# Patient Record
Sex: FEMALE | Race: BLACK OR AFRICAN AMERICAN | NOT HISPANIC OR LATINO | Employment: UNEMPLOYED | URBAN - METROPOLITAN AREA
[De-identification: names, ages, dates, MRNs, and addresses within clinical notes are randomized per-mention and may not be internally consistent; named-entity substitution may affect disease eponyms.]

---

## 2017-01-30 ENCOUNTER — OFFICE VISIT (OUTPATIENT)
Dept: MATERNAL FETAL MEDICINE | Facility: CLINIC | Age: 32
End: 2017-01-30
Payer: OTHER GOVERNMENT

## 2017-01-30 VITALS
WEIGHT: 157 LBS | SYSTOLIC BLOOD PRESSURE: 99 MMHG | DIASTOLIC BLOOD PRESSURE: 60 MMHG | HEIGHT: 67 IN | BODY MASS INDEX: 24.64 KG/M2

## 2017-01-30 DIAGNOSIS — Z36.82 ENCOUNTER FOR (NT) NUCHAL TRANSLUCENCY SCAN: ICD-10-CM

## 2017-01-30 DIAGNOSIS — Z87.828 HISTORY OF MOTOR VEHICLE ACCIDENT: ICD-10-CM

## 2017-01-30 DIAGNOSIS — Z36.89 ENCOUNTER FOR FETAL ANATOMIC SURVEY: Primary | ICD-10-CM

## 2017-01-30 DIAGNOSIS — O30.041 DICHORIONIC DIAMNIOTIC TWIN PREGNANCY IN FIRST TRIMESTER: ICD-10-CM

## 2017-01-30 PROBLEM — Z86.69 HX OF MIGRAINE HEADACHES: Status: ACTIVE | Noted: 2017-01-30

## 2017-01-30 PROCEDURE — 76813 OB US NUCHAL MEAS 1 GEST: CPT | Mod: S$GLB,,, | Performed by: OBSTETRICS & GYNECOLOGY

## 2017-01-30 PROCEDURE — 76814 OB US NUCHAL MEAS ADD-ON: CPT | Mod: S$GLB,,, | Performed by: OBSTETRICS & GYNECOLOGY

## 2017-01-30 PROCEDURE — 76801 OB US < 14 WKS SINGLE FETUS: CPT | Mod: S$GLB,,, | Performed by: OBSTETRICS & GYNECOLOGY

## 2017-01-30 PROCEDURE — 99205 OFFICE O/P NEW HI 60 MIN: CPT | Mod: 25,S$GLB,, | Performed by: OBSTETRICS & GYNECOLOGY

## 2017-01-30 PROCEDURE — 76802 OB US < 14 WKS ADDL FETUS: CPT | Mod: S$GLB,,, | Performed by: OBSTETRICS & GYNECOLOGY
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
## 2017-02-06 ENCOUNTER — TELEPHONE (OUTPATIENT)
Dept: MATERNAL FETAL MEDICINE | Facility: CLINIC | Age: 32
End: 2017-02-06

## 2017-02-06 NOTE — TELEPHONE ENCOUNTER
Pt has been notified of Negative 1st trimester sequential screen result and that final result pending 2nd trimeter blood draw on 3/13/17.    Pt verbalized understanding of information.

## 2017-03-13 ENCOUNTER — OFFICE VISIT (OUTPATIENT)
Dept: MATERNAL FETAL MEDICINE | Facility: CLINIC | Age: 32
End: 2017-03-13
Payer: OTHER GOVERNMENT

## 2017-03-13 VITALS — DIASTOLIC BLOOD PRESSURE: 68 MMHG | SYSTOLIC BLOOD PRESSURE: 108 MMHG | WEIGHT: 167.88 LBS | BODY MASS INDEX: 26.3 KG/M2

## 2017-03-13 DIAGNOSIS — Z36.89 ENCOUNTER FOR FETAL ANATOMIC SURVEY: ICD-10-CM

## 2017-03-13 DIAGNOSIS — O28.3 ECHOGENIC INTRACARDIAC FOCUS OF FETUS ON PRENATAL ULTRASOUND: ICD-10-CM

## 2017-03-13 DIAGNOSIS — Z36.89 ENCOUNTER FOR ULTRASOUND TO CHECK FETAL GROWTH: Primary | ICD-10-CM

## 2017-03-13 PROCEDURE — 76811 OB US DETAILED SNGL FETUS: CPT | Mod: S$GLB,,, | Performed by: OBSTETRICS & GYNECOLOGY

## 2017-03-13 PROCEDURE — 99214 OFFICE O/P EST MOD 30 MIN: CPT | Mod: 25,S$GLB,, | Performed by: OBSTETRICS & GYNECOLOGY

## 2017-03-13 PROCEDURE — 76812 OB US DETAILED ADDL FETUS: CPT | Mod: S$GLB,,, | Performed by: OBSTETRICS & GYNECOLOGY

## 2017-03-13 RX ORDER — OXYCODONE AND ACETAMINOPHEN 10; 325 MG/1; MG/1
1 TABLET ORAL EVERY 4 HOURS PRN
Status: ON HOLD | COMMUNITY
End: 2017-07-03 | Stop reason: HOSPADM

## 2017-03-13 RX ORDER — NAPROXEN SODIUM 220 MG/1
81 TABLET, FILM COATED ORAL DAILY
COMMUNITY

## 2017-03-13 NOTE — PROGRESS NOTES
A detailed fetal anatomical ultrasound was completed today.  See official report in the imaging section of [facility].  Ultrasound noted no obvious fetal abnormalities.  Ultrasound findings consistent with established dating.    Echogenic intracardiac focus counseling and recommendations:    An echogenic intracardiac focus (EIF) was identified on ultrasound today. A detailed fetal anatomic ultrasound exam was performed, and no fetal structural malformations or other sonographic soft markers associated with Down Syndrome were seen. The patient is at low risk for Down Syndrome based on age, family hx, and/or prior screening. Given these factors, the risk for Down Syndrome is low and is not appreciably altered from the patients age-associated or screening-adjusted risk. Therefore, further screening or testing is not recommended nor was counseling for this finding performed.  EIFs occur in 5 - 15% of normal fetuses and are not associated with structural or functional cardiac abnormalities.  Thus, no further  sonographic evaluation nor  cardiac evaluation is recommended.  If either the patient or referring MD desires more information regarding this finding, a consultation with M can be scheduled. Please contact our office if you wish to schedule a consultation.    We also discussed contractility in twin gestation as she feels some contractions after exercise.  We discussed  Exercise regimens in pregnancy and types of exercise in pregnancy were recommended.

## 2017-03-20 ENCOUNTER — TELEPHONE (OUTPATIENT)
Dept: MATERNAL FETAL MEDICINE | Facility: CLINIC | Age: 32
End: 2017-03-20

## 2017-03-20 NOTE — TELEPHONE ENCOUNTER
Patient called c/o mild cramping every 15-20 minutes. Denies LOF and bleeding. Reports +FM. States she has only had 2 glasses of water today. Dr Sheridan notified and advised to hydrate. RN instructed patient to drink at least 8 glasses of water each day and to call her primary OB at [facility] if cramping did not resolve or became more frequent/painful. Patient verbalized understanding.

## 2017-04-04 ENCOUNTER — OFFICE VISIT (OUTPATIENT)
Dept: MATERNAL FETAL MEDICINE | Facility: CLINIC | Age: 32
End: 2017-04-04
Payer: OTHER GOVERNMENT

## 2017-04-04 VITALS — SYSTOLIC BLOOD PRESSURE: 106 MMHG | DIASTOLIC BLOOD PRESSURE: 55 MMHG

## 2017-04-04 DIAGNOSIS — Z36.89 ENCOUNTER FOR ULTRASOUND TO CHECK FETAL GROWTH: ICD-10-CM

## 2017-04-04 DIAGNOSIS — O28.0 ABNORMAL QUAD SCREEN: ICD-10-CM

## 2017-04-04 PROCEDURE — 99499 UNLISTED E&M SERVICE: CPT | Mod: S$GLB,,, | Performed by: OBSTETRICS & GYNECOLOGY

## 2017-04-04 PROCEDURE — 76812 OB US DETAILED ADDL FETUS: CPT | Mod: S$GLB,,, | Performed by: OBSTETRICS & GYNECOLOGY

## 2017-04-04 PROCEDURE — 76811 OB US DETAILED SNGL FETUS: CPT | Mod: S$GLB,,, | Performed by: OBSTETRICS & GYNECOLOGY

## 2017-05-02 ENCOUNTER — OFFICE VISIT (OUTPATIENT)
Dept: MATERNAL FETAL MEDICINE | Facility: CLINIC | Age: 32
End: 2017-05-02
Payer: OTHER GOVERNMENT

## 2017-05-02 VITALS — DIASTOLIC BLOOD PRESSURE: 60 MMHG | SYSTOLIC BLOOD PRESSURE: 104 MMHG

## 2017-05-02 DIAGNOSIS — Z36.89 ENCOUNTER FOR ULTRASOUND TO CHECK FETAL GROWTH: ICD-10-CM

## 2017-05-02 PROCEDURE — 76817 TRANSVAGINAL US OBSTETRIC: CPT | Mod: S$GLB,,, | Performed by: OBSTETRICS & GYNECOLOGY

## 2017-05-02 PROCEDURE — 99214 OFFICE O/P EST MOD 30 MIN: CPT | Mod: GT,25,S$GLB, | Performed by: OBSTETRICS & GYNECOLOGY

## 2017-05-02 PROCEDURE — 76816 OB US FOLLOW-UP PER FETUS: CPT | Mod: 59,S$GLB,, | Performed by: OBSTETRICS & GYNECOLOGY

## 2017-05-02 NOTE — PROGRESS NOTES
See viewpoint US report    The pt. C/o increasing contractility which has brought her to the hospital several times.  The cervix is noted to be short and funneled but with what appears to be good cervical body at least 2.5 cm in length.  I reiterated the lack of intervention that is effective in twin PTL.  I discussed the role of steroids and Mg++ in the process.

## 2017-05-09 ENCOUNTER — TELEPHONE (OUTPATIENT)
Dept: MATERNAL FETAL MEDICINE | Facility: CLINIC | Age: 32
End: 2017-05-09

## 2017-05-09 NOTE — TELEPHONE ENCOUNTER
"----- Message from Yoselin Teddy sent at 5/9/2017  4:33 PM CDT -----  Contact: self  Pt calling to speak with Dr Burr's nurse regarding light headed,contractions at night, shortness of breath all symptoms patient has been experiencing for last two days. Pt has been cramping for last month due to short cervix per patient. Pt can be reached at [phone].      Pt states that for the past 2 days she has been feeling "lightheaded, overheated and dizzy." Pt also reports "pains in my chest." Pt reports that she "just does not feel good." Pt states that she is drinking water "all day long," but still wakes up during the night with contractions, "around 7-8." Recommended that pt go to nearest hospital ([facility]) for evaluation. Pt currently 27w2d with Di-Di twins and short cervix per M ultrasound. Pt was waiting for a "call back" from the nurse at [facility], but I informed pt that she can go straight to the hospital for evaluation. Also recommended that pt have someone drive her given the symptoms of feeling "dizzy."    Pt verbalized understanding of information and pt also given the contact information for [facility] L&D.      "

## 2017-05-23 ENCOUNTER — OFFICE VISIT (OUTPATIENT)
Dept: MATERNAL FETAL MEDICINE | Facility: CLINIC | Age: 32
End: 2017-05-23
Payer: OTHER GOVERNMENT

## 2017-05-23 DIAGNOSIS — O30.043 DICHORIONIC DIAMNIOTIC TWIN PREGNANCY IN THIRD TRIMESTER: ICD-10-CM

## 2017-05-23 DIAGNOSIS — O40.3XX2: ICD-10-CM

## 2017-05-23 DIAGNOSIS — Z36.89 ENCOUNTER FOR ULTRASOUND TO CHECK FETAL GROWTH: ICD-10-CM

## 2017-05-23 PROCEDURE — 99499 UNLISTED E&M SERVICE: CPT | Mod: S$GLB,,, | Performed by: OBSTETRICS & GYNECOLOGY

## 2017-05-23 PROCEDURE — 76816 OB US FOLLOW-UP PER FETUS: CPT | Mod: S$GLB,,, | Performed by: OBSTETRICS & GYNECOLOGY

## 2017-05-23 NOTE — PROGRESS NOTES
"Indication  ========    Twin, Evaluation of fetal growth.    History  ======    General History  Height 170 cm  Height (ft) 5 ft  Height (in) 7 in  Other: Dr. Niki Kitchen  Medical History  Past surgical history: Previous surgeries performed  Surgery:  section  Year   Previous Outcomes   2  Para 1  [place] children born living (T) 1  [place] children born (T) 1  [place] living children (L) 1  Risk Factors  History risk factors: Multiple gestation  Details: Di-Di twins  History risk factors: Hx Preeclampsia  Details: Per patient  Medication  Medication: PNV  Medication: Iron  Medication: magox  Medication: baby asa  Medication: percocet    Pregnancy History  ==============    Maternal Lab Tests  Test: Sequential Screen Part 2  Result: Positive DSR 1:140. Age DSR 1:711  Wants to know gender: yes    Method  ======    Transabdominal ultrasound examination. View: Sufficient.    Pregnancy  =========    Twin pregnancy. Dichorionic-diamniotic. Number of fetuses: 2.    Dating  ======    Cycle: regular cycle  GA by "stated dating" 29 w + 2 d  DARIN by "stated dating": 2017  Ultrasound examination on: 2017  GA by U/S based upon: AC, BPD, Femur, HC  GA by U/S 29 w + 2 d  DARIN by U/S: 2017  GA by U/S based upon (Fetus 2): AC, BPD, Femur, HC  GA by U/S (Fetus 2) 29 w + 2 d  DARIN by U/S (Fetus 2): 2017  Assigned: The Best Overall Assessment is based on the stated EDC.  Assigned GA 29 w + 2 d  Assigned DARIN: 2017    General Evaluation (1)  =================    Cardiac activity: present.  bpm.  Fetal movements: visualized.  Presentation: cephalic right.  Placenta:  Placental site: anterior.  Umbilical cord: Cord vessels: 3 vessel cord.  Amniotic fluid: Amount of AF: normal amount. MVP 3.8 cm.    Biophysical Profile (1)  =================    2: Fetal breathing movements  2: Gross body movements  2: Fetal tone  2: Amniotic fluid volume  : Biophysical profile score    Fetal Biometry " (1)  ==============    Fetal Biometry  BPD 75.3 mm 30w 1d Hadlock  OFD 96.1 mm 31w 0d Kristal  .0 mm 29w 6d Hadlock  .4 mm 28w 4d Hadlock  Femur 53.1 mm 28w 1d Hadlock  Humerus 49.2 mm 29w 0d Kristal  EFW 1,270 g 20% 28w 2d Hadlock  Calculated by: Hadlock (BPD-HC-AC-FL)  EFW (lb) 2 lb  EFW (oz) 13 oz  EFW discordance 9.4 %  Cephalic index 0.78  HC / AC 1.13  FL / BPD 0.71  FL / AC 0.22  MVP 3.8 cm   bpm    Fetal Anatomy (1)  ==============    Cranium: normal  Lateral ventricles: normal  Choroid plexus: documented previously  Midline falx: documented previously  Cavum septi pellucidi: normal  Cerebellum: documented previously  Cisterna magna: documented previously  Lips: documented previously  Profile: documented previously  Nose: documented previously  RVOT: documented previously  LVOT: documented previously  4-chamber view: 4-chamber normal, septum previously documented  Situs: documented previously  Diaphragm: normal  Cord insertion: documented previously  Stomach: normal  Kidneys: normal  Bladder: normal  Genitals: normal  Cervical spine: documented previously  Thoracic spine: documented previously  Lumbar spine: documented previously  Sacral spine: documented previously  Arms: both visualized  Legs: both visualized  Gender: male  Wants to know gender: yes  Other: Right hand previously open. Left hand previously seen open    General Evaluation (2)  =================    Cardiac activity: present.  bpm.  Fetal movements: visualized.  Presentation: breech left.  Placenta:  Placental site: left lateral, fundal.  Umbilical cord: Cord vessels: 3 vessel cord.  Amniotic fluid: Amount of AF: polyhydramnios. MVP 8.7 cm.    Biophysical Profile (2)  =================    2: Fetal breathing movements  2: Gross body movements  2: Fetal tone  2: Amniotic fluid volume  8/8: Biophysical profile score    Fetal Biometry (2)  ==============    Fetal Biometry  BPD 72.6 mm 29w 1d Hadlock  OFD 95.6 mm 30w 6d  Kristal  .0 mm 29w 4d Hadlock  .8 mm 30w 0d Hadlock  Femur 54.2 mm 28w 5d Hadlock  Humerus 49.9 mm 29w 2d Kristal  EFW 1,402 g 44% 29w 1d Hadlock  Calculated by: Hadlock (BPD-HC-AC-FL)  EFW (lb) 3 lb  EFW (oz) 1 oz  EFW discordance 9.4 %  Cephalic index 0.76  HC / AC 1.05  FL / BPD 0.75  FL / AC 0.21  MVP 8.7 cm   bpm    Fetal Anatomy (2)  ==============    Cranium: normal  Lateral ventricles: normal  Choroid plexus: documented previously  Midline falx: documented previously  Cavum septi pellucidi: normal  Cerebellum: documented previously  Cisterna magna: documented previously  Lips: documented previously  Profile: normal  Nose: documented previously  RVOT: documented previously  LVOT: documented previously  4-chamber view: 4-chamber normal, septum previously documented  Situs: documented previously  Diaphragm: normal  Cord insertion: documented previously  Stomach: normal  Kidneys: normal  Bladder: normal  Genitals: normal  Cervical spine: documented previously  Thoracic spine: documented previously  Lumbar spine: documented previously  Sacral spine: documented previously  Arms: both visualized  Legs: both visualized  Gender: male  Wants to know gender: yes  Other: Right hand previously open. Left hand previously seen open    Impression  =========    Live dichorionic/diamniotic twin intrauterine gestation.  Both fetuses with overall normal fetal growth. Fetus A at the 20th percentile. Fetus B at the 44th percentile.  There is 9.4 percent discordance.  Fetus A has a normal amniotic fluid volume.  Fetus B has polyhydramnios. The differential diagnosis of polyhydramnios was discussed with the patient including but not limited to idiopathic  causes, gestational diabetes, chromosomal abnormalities, genetic disorders, structural malformations, and neuromuscular conditions. The  fetal stomach appeared normal today. The patient's genetic screening was positive for an increased risk for Trisomy 21 and she  declined  amniocentesis. Polyhydramnios increases the risk for  labor, PPROM, and abruption. The patient is already at higher risk for  complications due to the twin gestation. The patient had her GDM screening yesterday and is awaiting results.  Limited anatomy of both fetuses was normal.  BPP 8/8 on both fetuses.      Recommendation  ==============    Weekly BPP with MFM  Follow up ultrasound for fetal growth in 3 weeks.

## 2017-05-30 ENCOUNTER — OFFICE VISIT (OUTPATIENT)
Dept: MATERNAL FETAL MEDICINE | Facility: CLINIC | Age: 32
End: 2017-05-30
Payer: OTHER GOVERNMENT

## 2017-05-30 DIAGNOSIS — Z36.89 ENCOUNTER FOR ULTRASOUND TO CHECK FETAL GROWTH: ICD-10-CM

## 2017-05-30 PROBLEM — O30.043 DICHORIONIC DIAMNIOTIC TWIN PREGNANCY IN THIRD TRIMESTER: Status: ACTIVE | Noted: 2017-01-30

## 2017-05-30 PROCEDURE — 99499 UNLISTED E&M SERVICE: CPT | Mod: S$GLB,,, | Performed by: OBSTETRICS & GYNECOLOGY

## 2017-05-30 PROCEDURE — 76815 OB US LIMITED FETUS(S): CPT | Mod: S$GLB,,, | Performed by: OBSTETRICS & GYNECOLOGY

## 2017-05-30 PROCEDURE — 76819 FETAL BIOPHYS PROFIL W/O NST: CPT | Mod: 59,S$GLB,, | Performed by: OBSTETRICS & GYNECOLOGY

## 2017-06-06 ENCOUNTER — OFFICE VISIT (OUTPATIENT)
Dept: MATERNAL FETAL MEDICINE | Facility: CLINIC | Age: 32
End: 2017-06-06
Payer: OTHER GOVERNMENT

## 2017-06-06 DIAGNOSIS — Z36.89 ENCOUNTER FOR ULTRASOUND TO CHECK FETAL GROWTH: ICD-10-CM

## 2017-06-06 PROCEDURE — 76819 FETAL BIOPHYS PROFIL W/O NST: CPT | Mod: S$GLB,,, | Performed by: OBSTETRICS & GYNECOLOGY

## 2017-06-06 PROCEDURE — 99499 UNLISTED E&M SERVICE: CPT | Mod: S$GLB,,, | Performed by: OBSTETRICS & GYNECOLOGY

## 2017-06-07 ENCOUNTER — TELEPHONE (OUTPATIENT)
Dept: MATERNAL FETAL MEDICINE | Facility: CLINIC | Age: 32
End: 2017-06-07

## 2017-06-07 NOTE — TELEPHONE ENCOUNTER
"----- Message from Yi Rock sent at 6/7/2017  9:49 AM CDT -----  Contact: Self  Pt Modesta Cerda MRN MRN [id number] calling to see if she should go to triage, only felt baby move 2 times since 5 am. Pt going to call OB as well. Pt can be reached at [phone].       Pt states that she has been up since 5am, drank "cold water, orange juice and ate donuts," and pt has noticed that the babies (twin gestation at 31w3d) "have not been moving as much." Recommended that pt got to the L&D at [facility] for evaluation. Pt verbalized understanding of information.    "

## 2017-06-13 ENCOUNTER — OFFICE VISIT (OUTPATIENT)
Dept: MATERNAL FETAL MEDICINE | Facility: CLINIC | Age: 32
End: 2017-06-13
Payer: OTHER GOVERNMENT

## 2017-06-13 DIAGNOSIS — Z36.89 ENCOUNTER FOR ULTRASOUND TO CHECK FETAL GROWTH: Primary | ICD-10-CM

## 2017-06-13 DIAGNOSIS — Z36.89 ENCOUNTER FOR ULTRASOUND TO CHECK FETAL GROWTH: ICD-10-CM

## 2017-06-13 PROCEDURE — 99499 UNLISTED E&M SERVICE: CPT | Mod: S$GLB,,, | Performed by: OBSTETRICS & GYNECOLOGY

## 2017-06-13 PROCEDURE — 76819 FETAL BIOPHYS PROFIL W/O NST: CPT | Mod: 59,S$GLB,, | Performed by: OBSTETRICS & GYNECOLOGY

## 2017-06-13 PROCEDURE — 76816 OB US FOLLOW-UP PER FETUS: CPT | Mod: 59,S$GLB,, | Performed by: OBSTETRICS & GYNECOLOGY

## 2017-06-20 ENCOUNTER — OFFICE VISIT (OUTPATIENT)
Dept: MATERNAL FETAL MEDICINE | Facility: CLINIC | Age: 32
End: 2017-06-20
Payer: OTHER GOVERNMENT

## 2017-06-20 DIAGNOSIS — Z36.89 ENCOUNTER FOR ULTRASOUND TO CHECK FETAL GROWTH: ICD-10-CM

## 2017-06-20 PROCEDURE — 76819 FETAL BIOPHYS PROFIL W/O NST: CPT | Mod: 59,S$GLB,, | Performed by: OBSTETRICS & GYNECOLOGY

## 2017-06-20 PROCEDURE — 99499 UNLISTED E&M SERVICE: CPT | Mod: S$GLB,,, | Performed by: OBSTETRICS & GYNECOLOGY

## 2017-06-20 PROCEDURE — 76815 OB US LIMITED FETUS(S): CPT | Mod: S$GLB,,, | Performed by: OBSTETRICS & GYNECOLOGY

## 2017-06-27 ENCOUNTER — OFFICE VISIT (OUTPATIENT)
Dept: MATERNAL FETAL MEDICINE | Facility: CLINIC | Age: 32
End: 2017-06-27
Payer: OTHER GOVERNMENT

## 2017-06-27 DIAGNOSIS — Z36.89 ENCOUNTER FOR ULTRASOUND TO CHECK FETAL GROWTH: ICD-10-CM

## 2017-06-27 PROCEDURE — 76819 FETAL BIOPHYS PROFIL W/O NST: CPT | Mod: S$GLB,,, | Performed by: OBSTETRICS & GYNECOLOGY

## 2017-06-27 PROCEDURE — 99499 UNLISTED E&M SERVICE: CPT | Mod: S$GLB,,, | Performed by: OBSTETRICS & GYNECOLOGY

## 2017-06-30 ENCOUNTER — HOSPITAL ENCOUNTER (INPATIENT)
Facility: OTHER | Age: 32
LOS: 3 days | Discharge: HOME | End: 2017-07-04
Attending: OBSTETRICS & GYNECOLOGY | Admitting: OBSTETRICS & GYNECOLOGY
Payer: OTHER GOVERNMENT

## 2017-06-30 ENCOUNTER — ANESTHESIA (OUTPATIENT)
Dept: OBSTETRICS AND GYNECOLOGY | Facility: OTHER | Age: 32
End: 2017-06-30
Payer: OTHER GOVERNMENT

## 2017-06-30 DIAGNOSIS — O36.8390 NON-REASSURING ELECTRONIC FETAL MONITORING TRACING: ICD-10-CM

## 2017-06-30 DIAGNOSIS — Z98.891 S/P REPEAT LOW TRANSVERSE C-SECTION: Primary | ICD-10-CM

## 2017-06-30 PROBLEM — Z87.59 HISTORY OF ECLAMPSIA: Status: ACTIVE | Noted: 2017-06-30

## 2017-06-30 LAB
ABO + RH BLD: NORMAL
ALBUMIN SERPL BCP-MCNC: 2.7 G/DL
ALP SERPL-CCNC: 154 U/L
ALT SERPL W/O P-5'-P-CCNC: 12 U/L
ANION GAP SERPL CALC-SCNC: 8 MMOL/L
AST SERPL-CCNC: 17 U/L
BASOPHILS # BLD AUTO: 0.02 K/UL
BASOPHILS NFR BLD: 0.2 %
BILIRUB SERPL-MCNC: 0.5 MG/DL
BILIRUB UR QL STRIP: NEGATIVE
BLD GP AB SCN CELLS X3 SERPL QL: NORMAL
BUN SERPL-MCNC: 4 MG/DL
CALCIUM SERPL-MCNC: 8.8 MG/DL
CHLORIDE SERPL-SCNC: 109 MMOL/L
CLARITY UR: CLEAR
CO2 SERPL-SCNC: 21 MMOL/L
COLOR UR: YELLOW
CREAT SERPL-MCNC: 0.6 MG/DL
CREAT UR-MCNC: 115.1 MG/DL
DIFFERENTIAL METHOD: ABNORMAL
EOSINOPHIL # BLD AUTO: 0 K/UL
EOSINOPHIL NFR BLD: 0.2 %
ERYTHROCYTE [DISTWIDTH] IN BLOOD BY AUTOMATED COUNT: 14.1 %
EST. GFR  (AFRICAN AMERICAN): >60 ML/MIN/1.73 M^2
EST. GFR  (NON AFRICAN AMERICAN): >60 ML/MIN/1.73 M^2
GLUCOSE SERPL-MCNC: 59 MG/DL
GLUCOSE UR QL STRIP: NEGATIVE
HCT VFR BLD AUTO: 29.2 %
HGB BLD-MCNC: 9.7 G/DL
HGB UR QL STRIP: ABNORMAL
KETONES UR QL STRIP: ABNORMAL
LEUKOCYTE ESTERASE UR QL STRIP: NEGATIVE
LYMPHOCYTES # BLD AUTO: 1.5 K/UL
LYMPHOCYTES NFR BLD: 17.4 %
MCH RBC QN AUTO: 30.3 PG
MCHC RBC AUTO-ENTMCNC: 33.2 %
MCV RBC AUTO: 91 FL
MONOCYTES # BLD AUTO: 0.4 K/UL
MONOCYTES NFR BLD: 4.1 %
NEUTROPHILS # BLD AUTO: 6.5 K/UL
NEUTROPHILS NFR BLD: 76.2 %
NITRITE UR QL STRIP: NEGATIVE
PH UR STRIP: 7 [PH] (ref 5–8)
PLATELET # BLD AUTO: 197 K/UL
PMV BLD AUTO: 9.2 FL
POTASSIUM SERPL-SCNC: 3.8 MMOL/L
PROT SERPL-MCNC: 6.2 G/DL
PROT UR QL STRIP: NEGATIVE
PROT UR-MCNC: 17 MG/DL
PROT/CREAT RATIO, UR: 0.15
RBC # BLD AUTO: 3.2 M/UL
SODIUM SERPL-SCNC: 138 MMOL/L
SP GR UR STRIP: 1.01 (ref 1–1.03)
URN SPEC COLLECT METH UR: ABNORMAL
UROBILINOGEN UR STRIP-ACNC: NEGATIVE EU/DL
WBC # BLD AUTO: 8.47 K/UL

## 2017-06-30 PROCEDURE — 81003 URINALYSIS AUTO W/O SCOPE: CPT

## 2017-06-30 PROCEDURE — 59025 FETAL NON-STRESS TEST: CPT | Mod: 26,,, | Performed by: OBSTETRICS & GYNECOLOGY

## 2017-06-30 PROCEDURE — G0378 HOSPITAL OBSERVATION PER HR: HCPCS

## 2017-06-30 PROCEDURE — 80053 COMPREHEN METABOLIC PANEL: CPT

## 2017-06-30 PROCEDURE — 25000003 PHARM REV CODE 250: Performed by: STUDENT IN AN ORGANIZED HEALTH CARE EDUCATION/TRAINING PROGRAM

## 2017-06-30 PROCEDURE — 85025 COMPLETE CBC W/AUTO DIFF WBC: CPT

## 2017-06-30 PROCEDURE — 11000001 HC ACUTE MED/SURG PRIVATE ROOM

## 2017-06-30 PROCEDURE — 25000003 PHARM REV CODE 250: Performed by: OBSTETRICS & GYNECOLOGY

## 2017-06-30 PROCEDURE — 82570 ASSAY OF URINE CREATININE: CPT

## 2017-06-30 PROCEDURE — 99284 EMERGENCY DEPT VISIT MOD MDM: CPT

## 2017-06-30 PROCEDURE — 86901 BLOOD TYPING SEROLOGIC RH(D): CPT

## 2017-06-30 PROCEDURE — 99284 EMERGENCY DEPT VISIT MOD MDM: CPT | Mod: 25,,, | Performed by: OBSTETRICS & GYNECOLOGY

## 2017-06-30 PROCEDURE — 86900 BLOOD TYPING SEROLOGIC ABO: CPT

## 2017-06-30 PROCEDURE — 99219 PR INITIAL OBSERVATION CARE,LEVL II: CPT | Mod: 25,,, | Performed by: OBSTETRICS & GYNECOLOGY

## 2017-06-30 RX ORDER — SIMETHICONE 80 MG
1 TABLET,CHEWABLE ORAL EVERY 6 HOURS PRN
Status: DISCONTINUED | OUTPATIENT
Start: 2017-06-30 | End: 2017-07-01

## 2017-06-30 RX ORDER — DIPHENHYDRAMINE HCL 25 MG
25 CAPSULE ORAL EVERY 4 HOURS PRN
Status: DISCONTINUED | OUTPATIENT
Start: 2017-06-30 | End: 2017-07-01

## 2017-06-30 RX ORDER — DOCUSATE SODIUM 100 MG/1
100 CAPSULE, LIQUID FILLED ORAL DAILY
Status: DISCONTINUED | OUTPATIENT
Start: 2017-06-30 | End: 2017-06-30

## 2017-06-30 RX ORDER — IRON POLYSACCHARIDE COMPLEX 150 MG
150 CAPSULE ORAL DAILY
Status: DISCONTINUED | OUTPATIENT
Start: 2017-06-30 | End: 2017-07-04 | Stop reason: HOSPADM

## 2017-06-30 RX ORDER — DEXTROSE MONOHYDRATE, SODIUM CHLORIDE, AND POTASSIUM CHLORIDE 50; 1.49; 9 G/1000ML; G/1000ML; G/1000ML
INJECTION, SOLUTION INTRAVENOUS CONTINUOUS
Status: DISCONTINUED | OUTPATIENT
Start: 2017-06-30 | End: 2017-07-01

## 2017-06-30 RX ORDER — DOCUSATE SODIUM 100 MG/1
100 CAPSULE, LIQUID FILLED ORAL DAILY
Status: DISCONTINUED | OUTPATIENT
Start: 2017-06-30 | End: 2017-07-01

## 2017-06-30 RX ORDER — OXYCODONE AND ACETAMINOPHEN 5; 325 MG/1; MG/1
1 TABLET ORAL 2 TIMES DAILY
Status: DISCONTINUED | OUTPATIENT
Start: 2017-06-30 | End: 2017-06-30

## 2017-06-30 RX ORDER — BETAMETHASONE SODIUM PHOSPHATE AND BETAMETHASONE ACETATE 3; 3 MG/ML; MG/ML
12 INJECTION, SUSPENSION INTRA-ARTICULAR; INTRALESIONAL; INTRAMUSCULAR; SOFT TISSUE ONCE
Status: COMPLETED | OUTPATIENT
Start: 2017-07-01 | End: 2017-07-01

## 2017-06-30 RX ORDER — ACETAMINOPHEN 325 MG/1
650 TABLET ORAL ONCE
Status: COMPLETED | OUTPATIENT
Start: 2017-06-30 | End: 2017-06-30

## 2017-06-30 RX ORDER — IRON POLYSACCHARIDE COMPLEX 150 MG
150 CAPSULE ORAL DAILY
Status: DISCONTINUED | OUTPATIENT
Start: 2017-06-30 | End: 2017-06-30

## 2017-06-30 RX ORDER — SODIUM CHLORIDE, SODIUM LACTATE, POTASSIUM CHLORIDE, CALCIUM CHLORIDE 600; 310; 30; 20 MG/100ML; MG/100ML; MG/100ML; MG/100ML
INJECTION, SOLUTION INTRAVENOUS CONTINUOUS
Status: DISCONTINUED | OUTPATIENT
Start: 2017-06-30 | End: 2017-06-30

## 2017-06-30 RX ORDER — AMOXICILLIN 250 MG
1 CAPSULE ORAL NIGHTLY PRN
Status: DISCONTINUED | OUTPATIENT
Start: 2017-06-30 | End: 2017-07-01

## 2017-06-30 RX ORDER — DIPHENHYDRAMINE HYDROCHLORIDE 50 MG/ML
25 INJECTION INTRAMUSCULAR; INTRAVENOUS EVERY 4 HOURS PRN
Status: DISCONTINUED | OUTPATIENT
Start: 2017-06-30 | End: 2017-07-01

## 2017-06-30 RX ORDER — ONDANSETRON 8 MG/1
8 TABLET, ORALLY DISINTEGRATING ORAL EVERY 8 HOURS PRN
Status: DISCONTINUED | OUTPATIENT
Start: 2017-06-30 | End: 2017-07-01

## 2017-06-30 RX ORDER — PRENATAL WITH FERROUS FUM AND FOLIC ACID 3080; 920; 120; 400; 22; 1.84; 3; 20; 10; 1; 12; 200; 27; 25; 2 [IU]/1; [IU]/1; MG/1; [IU]/1; MG/1; MG/1; MG/1; MG/1; MG/1; MG/1; UG/1; MG/1; MG/1; MG/1; MG/1
1 TABLET ORAL DAILY
Status: DISCONTINUED | OUTPATIENT
Start: 2017-06-30 | End: 2017-07-01

## 2017-06-30 RX ORDER — NAPROXEN SODIUM 220 MG/1
81 TABLET, FILM COATED ORAL DAILY
Status: DISCONTINUED | OUTPATIENT
Start: 2017-06-30 | End: 2017-07-01

## 2017-06-30 RX ORDER — OXYCODONE AND ACETAMINOPHEN 5; 325 MG/1; MG/1
1 TABLET ORAL 2 TIMES DAILY
Status: DISCONTINUED | OUTPATIENT
Start: 2017-06-30 | End: 2017-07-01

## 2017-06-30 RX ADMIN — DEXTROSE, SODIUM CHLORIDE, AND POTASSIUM CHLORIDE: 5; .9; .15 INJECTION INTRAVENOUS at 07:06

## 2017-06-30 RX ADMIN — OXYCODONE HYDROCHLORIDE AND ACETAMINOPHEN 1 TABLET: 5; 325 TABLET ORAL at 06:06

## 2017-06-30 RX ADMIN — ACETAMINOPHEN 650 MG: 325 TABLET ORAL at 04:06

## 2017-06-30 RX ADMIN — SODIUM CHLORIDE, SODIUM LACTATE, POTASSIUM CHLORIDE, AND CALCIUM CHLORIDE: .6; .31; .03; .02 INJECTION, SOLUTION INTRAVENOUS at 01:06

## 2017-06-30 RX ADMIN — DOCUSATE SODIUM 100 MG: 100 CAPSULE, LIQUID FILLED ORAL at 09:06

## 2017-06-30 RX ADMIN — Medication 150 MG: at 09:06

## 2017-06-30 RX ADMIN — ASPIRIN 81 MG: 81 TABLET, CHEWABLE ORAL at 01:06

## 2017-06-30 NOTE — H&P
[facility]  Obstetrics  History & Physical    Patient Name: Modesta George  MRN: [id number]  Admission Date: 2017  Primary Care Provider: [facility]    Subjective:     Principal Problem:Non-reassuring electronic fetal monitoring tracing    History of Present Illness:  Modesta George is a 31 y.o. [id number] Di-Di twins at 34w5d presents from outside hospital ([facility]) with category 2 strip for both fetal tracings. Patient has history of  section due to non reassuring fetal heart tones and history of post partum Eclampsia. This IUP is complicated by GBS UTI and abnormal quad screen earlier in pregnancy and declined confirmatory testing. Patient reports migraines and backpain that currently treated with Percocet. Baby B previously polyhydramniotic but on newest US MVP 7.8cm.     Obstetric HPI:  Patient denies contractions, active fetal movement, No vaginal bleeding , No loss of fluid       Obstetric History       T1      L1     SAB0   TAB0   Ectopic0   Multiple0   Live Births1       # Outcome Date GA Lbr Elmer/2nd Weight Sex Delivery Anes PTL Lv   2 Current            1 Term 2013 37w0d  3.033 kg (6 lb 11 oz) M CS-LTranv   CAROL      Complications: Fetal Intolerance,Pre-eclampsia        Past Medical History:   Diagnosis Date    Arthritis of back     Bulging lumbar disc     Migraines      Past Surgical History:   Procedure Laterality Date     SECTION         PTA Medications   Medication Sig    ACETAMINOPHEN (TYLENOL ORAL) Take by mouth.    aspirin 81 MG Chew Take 81 mg by mouth once daily.    FAMOTIDINE (PEPCID ORAL) Take by mouth.    FERROUS FUMARATE (IRON ORAL) Take by mouth.    MAGNESIUM OXIDE (MAG-OXIDE ORAL) Take by mouth.    oxycodone-acetaminophen (PERCOCET)  mg per tablet Take 1 tablet by mouth every 4 (four) hours as needed for Pain (10 mg/day).    PNV95/FERROUS FUMARATE/FA (PRENATAL ORAL) Take by mouth.       Review of patient's  allergies indicates:   Allergen Reactions    Sulfa (sulfonamide antibiotics) Swelling        Family History     None        Social History Main Topics    Smoking status: Never Smoker    Smokeless tobacco: Never Used    Alcohol use No    Drug use: No    Sexual activity: Yes     Partners: Male     Review of Systems   Genitourinary: Negative for vaginal bleeding, vaginal discharge, vaginal pain and vaginal odor.   All other systems reviewed and are negative.     Objective:     Vital Signs (Most Recent):  Temp: 98.2 °F (36.8 °C) (06/30/17 1101)  Pulse: 83 (06/30/17 1225)  Resp: 16 (06/30/17 1110)  BP: 126/71 (06/30/17 1225)  SpO2: 100 % (06/30/17 1223) Vital Signs (24h Range):  Temp:  [98.2 °F (36.8 °C)] 98.2 °F (36.8 °C)  Pulse:  [80-99] 83  Resp:  [16] 16  SpO2:  [100 %] 100 %  BP: (118-128)/(64-73) 126/71        There is no height or weight on file to calculate BMI.    FHT:   A: Cat 1 (reassuring). 125 BPM, mod variability, positive accel, negative decel  B: Cat 1. 135 BPM, mod variability, positive accel, negative decel  TOCO: irritable    Physical Exam:   Constitutional: She appears well-developed and well-nourished.    HENT:   Head: Normocephalic.   Right Ear: External ear normal.   Left Ear: External ear normal.      Cardiovascular: Normal rate, regular rhythm and normal heart sounds.     Pulmonary/Chest: Effort normal and breath sounds normal. No respiratory distress. She has no wheezes.        Abdominal: Soft. Bowel sounds are normal. She exhibits distension (gravid). She exhibits no abdominal incision.                   Psychiatric: She has a normal mood and affect. Her behavior is normal. Judgment and thought content normal.       Cervix:  Dilation:  1  Effacement:  50%  Station: -2  Presentation: Vertex/vertex     Significant Labs:  No results found for: GROUPTRH, HEPBSAG, RUBELLAIGGSC, STREPBCULT, AFP, [id number]    I have personallly reviewed all pertinent lab results from the last 24  hours.    Assessment/Plan:     31 y.o. female  at 34w5d for:    History of postpartum eclampsia    - Pre E labs wnl  Temp:  [98.2 °F (36.8 °C)] 98.2 °F (36.8 °C)  Pulse:  [80-99] 83  Resp:  [16] 16  SpO2:  [100 %] 100 %  BP: (118-128)/(64-73) 126/71  - Continue to monitor vitals q4        Abnormal quad screen    - Stable. Declined confirmatory testing. No intervention.         Dichorionic diamniotic twin pregnancy in third trimester    - Z  0830 17. Plan for second dose 24 hours after  - Continuous fetal monitoring  - NPO  - If labors, PCN for GBS status  - No mag as >32w0d  - Continue to clinically monitor            Silvio Rolon MD  Obstetrics  [facility]
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
## 2017-06-30 NOTE — ASSESSMENT & PLAN NOTE
- Pre E labs wnl  Temp:  [98.2 °F (36.8 °C)] 98.2 °F (36.8 °C)  Pulse:  [80-99] 83  Resp:  [16] 16  SpO2:  [100 %] 100 %  BP: (118-128)/(64-73) 126/71  - Continue to monitor vitals q4

## 2017-06-30 NOTE — SUBJECTIVE & OBJECTIVE
Obstetric HPI:  Patient denies contractions, active fetal movement, No vaginal bleeding , No loss of fluid       Obstetric History       T1      L1     SAB0   TAB0   Ectopic0   Multiple0   Live Births1       # Outcome Date GA Lbr Elmer/2nd Weight Sex Delivery Anes PTL Lv   2 Current            1 Term 2013 37w0d  3.033 kg (6 lb 11 oz) M CS-LTranv   CAROL      Complications: Fetal Intolerance,Pre-eclampsia        Past Medical History:   Diagnosis Date    Arthritis of back     Bulging lumbar disc     Migraines      Past Surgical History:   Procedure Laterality Date     SECTION         PTA Medications   Medication Sig    ACETAMINOPHEN (TYLENOL ORAL) Take by mouth.    aspirin 81 MG Chew Take 81 mg by mouth once daily.    FAMOTIDINE (PEPCID ORAL) Take by mouth.    FERROUS FUMARATE (IRON ORAL) Take by mouth.    MAGNESIUM OXIDE (MAG-OXIDE ORAL) Take by mouth.    oxycodone-acetaminophen (PERCOCET)  mg per tablet Take 1 tablet by mouth every 4 (four) hours as needed for Pain (10 mg/day).    PNV95/FERROUS FUMARATE/FA (PRENATAL ORAL) Take by mouth.       Review of patient's allergies indicates:   Allergen Reactions    Sulfa (sulfonamide antibiotics) Swelling        Family History     None        Social History Main Topics    Smoking status: Never Smoker    Smokeless tobacco: Never Used    Alcohol use No    Drug use: No    Sexual activity: Yes     Partners: Male     Review of Systems   Genitourinary: Negative for vaginal bleeding, vaginal discharge, vaginal pain and vaginal odor.   All other systems reviewed and are negative.     Objective:     Vital Signs (Most Recent):  Temp: 98.2 °F (36.8 °C) (17 1101)  Pulse: 83 (17 1225)  Resp: 16 (17 1110)  BP: 126/71 (17 1225)  SpO2: 100 % (17 1223) Vital Signs (24h Range):  Temp:  [98.2 °F (36.8 °C)] 98.2 °F (36.8 °C)  Pulse:  [80-99] 83  Resp:  [16] 16  SpO2:  [100 %] 100 %  BP: (118-128)/(64-73) 126/71         There is no height or weight on file to calculate BMI.    FHT:   A: Cat 1 (reassuring). 125 BPM, mod variability, positive accel, negative decel  B: Cat 1. 135 BPM, mod variability, positive accel, negative decel  TOCO: irritable    Physical Exam:   Constitutional: She appears well-developed and well-nourished.    HENT:   Head: Normocephalic.   Right Ear: External ear normal.   Left Ear: External ear normal.      Cardiovascular: Normal rate, regular rhythm and normal heart sounds.     Pulmonary/Chest: Effort normal and breath sounds normal. No respiratory distress. She has no wheezes.        Abdominal: Soft. Bowel sounds are normal. She exhibits distension (gravid). She exhibits no abdominal incision.                   Psychiatric: She has a normal mood and affect. Her behavior is normal. Judgment and thought content normal.       Cervix:  Dilation:  1  Effacement:  50%  Station: -2  Presentation: Vertex/vertex     Significant Labs:  No results found for: GROUPTRH, HEPBSAG, RUBELLAIGGSC, STREPBCULT, AFP, LXHRBGN0MB    I have personallly reviewed all pertinent lab results from the last 24 hours.

## 2017-06-30 NOTE — ED TRIAGE NOTES
Pt arrived via EMS (transport from [facility]) to OB ED. Pt c/o ctxs q 5 minutes. Denies LOF, VB. States +FM.

## 2017-06-30 NOTE — HOSPITAL COURSE
06/30/2017 - Transferred from outside hospital. Placed on continuous fetal monitoring, deceleration x1 noted, but overall remained reassuring. C/o ctx but cervix remained unchanged. Ctx subsided and she was deescalated to NST BID.  07/01/2017 - RLTCS or non-reassuring fetal surveillance, uncomplicated. Both twins in NICU.  07/03/2017 - Doing well, meeting milestones.   07/04/2017 - Doing wel, meeting milestones. Pt requests discharge as she lives in [place].

## 2017-06-30 NOTE — ASSESSMENT & PLAN NOTE
- SHERLY 1/2 0830 6/30/17. Plan for second dose 24 hours after  - Continuous fetal monitoring  - NPO  - If labors, PCN for GBS status  - No mag as >32w0d  - Continue to clinically monitor

## 2017-06-30 NOTE — ED PROVIDER NOTES
Encounter Date: 2017       History     Chief Complaint   Patient presents with    Non-stress Test     History of Present Illness:  Modesta George is a 31 y.o. [id number] Di-Di twins at 34w5d presents from outside hospital ([facility]) with category 2 strip for both fetal tracings. Patient has history of  section due to non reassuring fetal heart tones and history of post partum Eclampsia. Denies HA, vision changes, SOB. Does endorse RUQ pain intermittent over past few days.    This IUP is complicated by GBS UTI and abnormal quad screen earlier in pregnancy and declined confirmatory testing. Patient reports migraines and backpain that currently treated with Percocet. Baby B previously polyhydramniotic but on newest US MVP 7.8cm.      Obstetric HPI:  Patient denies contractions, active fetal movement, No vaginal bleeding , No loss of fluid             Review of patient's allergies indicates:   Allergen Reactions    Sulfa (sulfonamide antibiotics) Swelling     Past Medical History:   Diagnosis Date    Arthritis of back     Bulging lumbar disc     Migraines      Past Surgical History:   Procedure Laterality Date     SECTION       History reviewed. No pertinent family history.  Social History   Substance Use Topics    Smoking status: Never Smoker    Smokeless tobacco: Never Used    Alcohol use No     Review of Systems   Constitutional: Negative for fever.   HENT: Negative for sore throat.    Eyes: Negative for visual disturbance.   Respiratory: Negative for shortness of breath.    Cardiovascular: Negative for chest pain.   Gastrointestinal: Positive for abdominal pain (RUQ). Negative for nausea.   Genitourinary: Negative for dysuria, vaginal bleeding and vaginal discharge.   Musculoskeletal: Negative for back pain.   Skin: Negative for rash.   Neurological: Negative for seizures, weakness and headaches.   Hematological: Does not bruise/bleed easily.       Physical Exam     Initial Vitals   BP  Pulse Resp Temp SpO2   06/30/17 1101 06/30/17 1100 06/30/17 1110 06/30/17 1101 06/30/17 1100   126/73 85 16 98.2 °F (36.8 °C) 100 %      MAP       06/30/17 1101       90.67         Physical Exam    Nursing note and vitals reviewed.  Constitutional: She appears well-developed and well-nourished. She is not diaphoretic. No distress.   HENT:   Head: Normocephalic and atraumatic.   Eyes: Conjunctivae and EOM are normal.   Neck: Normal range of motion.   Cardiovascular: Normal rate.   Pulmonary/Chest: No respiratory distress.   Abdominal: Soft. She exhibits no distension. There is no tenderness.   Gravid uterus   Musculoskeletal: Normal range of motion.   Neurological: She is alert and oriented to person, place, and time.   Skin: Skin is warm and dry.   Psychiatric: She has a normal mood and affect.     OB LABOR EXAM:   Pre-Term Labor: No.   Membranes ruptured: No.                 Comments: BABY A:  NST Interpretation:   135 BPM baseline  Variability: moderate  Accelerations: present  Decelerations: absent  Contractions: irregular    Clinical Impression: Reactive Non-Stress Test    BABY B:  NST Interpretation:   140 BPM baseline  Variability: moderate  Accelerations: present  Decelerations: absent  Contractions: irregular    Clinical Impression: Reactive Non-Stress Test           ED Course   Procedures    Recent Labs  Lab 06/30/17  1210   WBC 8.47   HGB 9.7*   HCT 29.2*   MCV 91           Recent Labs  Lab 06/30/17  1210 06/30/17  1215     --    K 3.8  --      --    CO2 21*  --    BUN 4*  --    CREATININE 0.6  --    GLU 59*  --    PROT 6.2  --    BILITOT 0.5  --    ALKPHOS 154*  --    ALT 12  --    AST 17  --    UTPCR  --  0.15                                  ED Course     Clinical Impression:   The encounter diagnosis was Non-reassuring electronic fetal monitoring tracing. Also h/o eclampsia was pertinent to this visit.    - admit to antepartum service for BMZ series and prolonged monitoring of fetus  x2  - PreE labs wnl, BP normal    Plan was discussed with staff Dr. Holland and [facility] staff Dr. Pineda who agrees with above.                           Geeta Aguilar MD  Resident  07/01/17 [id number]

## 2017-06-30 NOTE — HPI
Modesta George is a 31 y.o. [id number] Di-Di twins at 34w5d presents from outside hospital ([facility]) with category 2 strip for both fetal tracings. Patient has history of  section due to non reassuring fetal heart tones and history of post partum Eclampsia. Denies HA, vision changes, SOB. Does endorse RUQ pain intermittent over past few days.     This IUP is complicated by GBS UTI and abnormal quad screen earlier in pregnancy and declined confirmatory testing. Patient reports migraines and backpain that currently treated with Percocet. Baby B previously polyhydramniotic but on newest US MVP 7.8cm

## 2017-07-01 PROBLEM — Z98.891 S/P REPEAT LOW TRANSVERSE C-SECTION: Status: ACTIVE | Noted: 2017-07-01

## 2017-07-01 LAB
ALLENS TEST: ABNORMAL
ALLENS TEST: ABNORMAL
HCO3 UR-SCNC: 21.5 MMOL/L (ref 24–28)
HCO3 UR-SCNC: 22.2 MMOL/L (ref 24–28)
PCO2 BLDA: 44.7 MMHG (ref 35–45)
PCO2 BLDA: 46.2 MMHG (ref 35–45)
PH SMN: 7.29 [PH] (ref 7.35–7.45)
PH SMN: 7.29 [PH] (ref 7.35–7.45)
PO2 BLDA: 17 MMHG (ref 80–100)
PO2 BLDA: 21 MMHG (ref 80–100)
POC BE: -4 MMOL/L
POC BE: -5 MMOL/L
POC SATURATED O2: 19 % (ref 95–100)
POC SATURATED O2: 28 % (ref 95–100)
SAMPLE: ABNORMAL
SAMPLE: ABNORMAL
SITE: ABNORMAL
SITE: ABNORMAL

## 2017-07-01 PROCEDURE — 99900035 HC TECH TIME PER 15 MIN (STAT)

## 2017-07-01 PROCEDURE — 25000003 PHARM REV CODE 250: Performed by: ANESTHESIOLOGY

## 2017-07-01 PROCEDURE — 59510 CESAREAN DELIVERY: CPT | Mod: ,,, | Performed by: OBSTETRICS & GYNECOLOGY

## 2017-07-01 PROCEDURE — 63600175 PHARM REV CODE 636 W HCPCS: Performed by: ANESTHESIOLOGY

## 2017-07-01 PROCEDURE — 11000001 HC ACUTE MED/SURG PRIVATE ROOM

## 2017-07-01 PROCEDURE — 88307 TISSUE EXAM BY PATHOLOGIST: CPT | Mod: 26,,, | Performed by: PATHOLOGY

## 2017-07-01 PROCEDURE — 87086 URINE CULTURE/COLONY COUNT: CPT

## 2017-07-01 PROCEDURE — 37000009 HC ANESTHESIA EA ADD 15 MINS: Performed by: OBSTETRICS & GYNECOLOGY

## 2017-07-01 PROCEDURE — 37000008 HC ANESTHESIA 1ST 15 MINUTES: Performed by: OBSTETRICS & GYNECOLOGY

## 2017-07-01 PROCEDURE — 88307 TISSUE EXAM BY PATHOLOGIST: CPT | Performed by: PATHOLOGY

## 2017-07-01 PROCEDURE — 82803 BLOOD GASES ANY COMBINATION: CPT

## 2017-07-01 PROCEDURE — 25000003 PHARM REV CODE 250: Performed by: OBSTETRICS & GYNECOLOGY

## 2017-07-01 PROCEDURE — 63600175 PHARM REV CODE 636 W HCPCS: Performed by: STUDENT IN AN ORGANIZED HEALTH CARE EDUCATION/TRAINING PROGRAM

## 2017-07-01 PROCEDURE — 99231 SBSQ HOSP IP/OBS SF/LOW 25: CPT | Mod: 25,,, | Performed by: OBSTETRICS & GYNECOLOGY

## 2017-07-01 PROCEDURE — 59514 CESAREAN DELIVERY ONLY: CPT | Mod: ,,, | Performed by: ANESTHESIOLOGY

## 2017-07-01 PROCEDURE — 63600175 PHARM REV CODE 636 W HCPCS: Performed by: OBSTETRICS & GYNECOLOGY

## 2017-07-01 PROCEDURE — 59514 CESAREAN DELIVERY ONLY: CPT | Mod: 51,,, | Performed by: OBSTETRICS & GYNECOLOGY

## 2017-07-01 PROCEDURE — 59025 FETAL NON-STRESS TEST: CPT | Mod: 26,,, | Performed by: OBSTETRICS & GYNECOLOGY

## 2017-07-01 PROCEDURE — 25000003 PHARM REV CODE 250: Performed by: STUDENT IN AN ORGANIZED HEALTH CARE EDUCATION/TRAINING PROGRAM

## 2017-07-01 RX ORDER — AMOXICILLIN 250 MG
1 CAPSULE ORAL NIGHTLY PRN
Status: DISCONTINUED | OUTPATIENT
Start: 2017-07-01 | End: 2017-07-04 | Stop reason: HOSPADM

## 2017-07-01 RX ORDER — FAMOTIDINE 10 MG/ML
20 INJECTION INTRAVENOUS ONCE
Status: COMPLETED | OUTPATIENT
Start: 2017-07-01 | End: 2017-07-01

## 2017-07-01 RX ORDER — CEFAZOLIN SODIUM 2 G/50ML
2 SOLUTION INTRAVENOUS ONCE
Status: COMPLETED | OUTPATIENT
Start: 2017-07-01 | End: 2017-07-01

## 2017-07-01 RX ORDER — OXYCODONE HYDROCHLORIDE 5 MG/1
5 TABLET ORAL EVERY 4 HOURS PRN
Status: DISCONTINUED | OUTPATIENT
Start: 2017-07-01 | End: 2017-07-04 | Stop reason: HOSPADM

## 2017-07-01 RX ORDER — METHYLERGONOVINE MALEATE 0.2 MG/ML
INJECTION INTRAVENOUS
Status: DISCONTINUED
Start: 2017-07-01 | End: 2017-07-01 | Stop reason: WASHOUT

## 2017-07-01 RX ORDER — SODIUM CHLORIDE, SODIUM LACTATE, POTASSIUM CHLORIDE, CALCIUM CHLORIDE 600; 310; 30; 20 MG/100ML; MG/100ML; MG/100ML; MG/100ML
INJECTION, SOLUTION INTRAVENOUS CONTINUOUS PRN
Status: DISCONTINUED | OUTPATIENT
Start: 2017-07-01 | End: 2017-07-02

## 2017-07-01 RX ORDER — SODIUM CITRATE AND CITRIC ACID MONOHYDRATE 334; 500 MG/5ML; MG/5ML
30 SOLUTION ORAL ONCE
Status: COMPLETED | OUTPATIENT
Start: 2017-07-01 | End: 2017-07-01

## 2017-07-01 RX ORDER — OXYCODONE AND ACETAMINOPHEN 10; 325 MG/1; MG/1
1 TABLET ORAL EVERY 4 HOURS PRN
Status: DISCONTINUED | OUTPATIENT
Start: 2017-07-02 | End: 2017-07-04 | Stop reason: HOSPADM

## 2017-07-01 RX ORDER — KETOROLAC TROMETHAMINE 30 MG/ML
INJECTION, SOLUTION INTRAMUSCULAR; INTRAVENOUS
Status: DISCONTINUED | OUTPATIENT
Start: 2017-07-01 | End: 2017-07-02

## 2017-07-01 RX ORDER — ONDANSETRON 8 MG/1
8 TABLET, ORALLY DISINTEGRATING ORAL EVERY 8 HOURS PRN
Status: DISCONTINUED | OUTPATIENT
Start: 2017-07-01 | End: 2017-07-04 | Stop reason: HOSPADM

## 2017-07-01 RX ORDER — CARBOPROST TROMETHAMINE 250 UG/ML
INJECTION, SOLUTION INTRAMUSCULAR
Status: DISCONTINUED
Start: 2017-07-01 | End: 2017-07-01 | Stop reason: WASHOUT

## 2017-07-01 RX ORDER — OXYTOCIN/RINGER'S LACTATE 20/1000 ML
41.65 PLASTIC BAG, INJECTION (ML) INTRAVENOUS CONTINUOUS
Status: ACTIVE | OUTPATIENT
Start: 2017-07-01 | End: 2017-07-02

## 2017-07-01 RX ORDER — BUPIVACAINE HYDROCHLORIDE 7.5 MG/ML
INJECTION, SOLUTION INTRASPINAL
Status: DISCONTINUED | OUTPATIENT
Start: 2017-07-01 | End: 2017-07-02

## 2017-07-01 RX ORDER — KETOROLAC TROMETHAMINE 30 MG/ML
30 INJECTION, SOLUTION INTRAMUSCULAR; INTRAVENOUS EVERY 6 HOURS
Status: DISCONTINUED | OUTPATIENT
Start: 2017-07-01 | End: 2017-07-02

## 2017-07-01 RX ORDER — ACETAMINOPHEN 10 MG/ML
INJECTION, SOLUTION INTRAVENOUS
Status: DISCONTINUED | OUTPATIENT
Start: 2017-07-01 | End: 2017-07-02

## 2017-07-01 RX ORDER — PHENYLEPHRINE HYDROCHLORIDE 10 MG/ML
INJECTION INTRAVENOUS
Status: DISCONTINUED | OUTPATIENT
Start: 2017-07-01 | End: 2017-07-02

## 2017-07-01 RX ORDER — DOCUSATE SODIUM 100 MG/1
200 CAPSULE, LIQUID FILLED ORAL 2 TIMES DAILY
Status: DISCONTINUED | OUTPATIENT
Start: 2017-07-01 | End: 2017-07-04 | Stop reason: HOSPADM

## 2017-07-01 RX ORDER — ONDANSETRON 2 MG/ML
INJECTION INTRAMUSCULAR; INTRAVENOUS
Status: DISCONTINUED | OUTPATIENT
Start: 2017-07-01 | End: 2017-07-02

## 2017-07-01 RX ORDER — OXYTOCIN 10 [USP'U]/ML
INJECTION, SOLUTION INTRAMUSCULAR; INTRAVENOUS
Status: DISCONTINUED
Start: 2017-07-01 | End: 2017-07-01 | Stop reason: WASHOUT

## 2017-07-01 RX ORDER — MORPHINE SULFATE 0.5 MG/ML
INJECTION, SOLUTION EPIDURAL; INTRATHECAL; INTRAVENOUS
Status: DISCONTINUED | OUTPATIENT
Start: 2017-07-01 | End: 2017-07-02

## 2017-07-01 RX ORDER — OXYCODONE HYDROCHLORIDE 5 MG/1
10 TABLET ORAL EVERY 4 HOURS PRN
Status: DISCONTINUED | OUTPATIENT
Start: 2017-07-01 | End: 2017-07-04 | Stop reason: HOSPADM

## 2017-07-01 RX ORDER — BISACODYL 10 MG
10 SUPPOSITORY, RECTAL RECTAL ONCE AS NEEDED
Status: ACTIVE | OUTPATIENT
Start: 2017-07-01 | End: 2017-07-01

## 2017-07-01 RX ORDER — HYDROCORTISONE 25 MG/G
CREAM TOPICAL 3 TIMES DAILY PRN
Status: DISCONTINUED | OUTPATIENT
Start: 2017-07-01 | End: 2017-07-04 | Stop reason: HOSPADM

## 2017-07-01 RX ORDER — DIPHENHYDRAMINE HCL 25 MG
25 CAPSULE ORAL EVERY 4 HOURS PRN
Status: DISCONTINUED | OUTPATIENT
Start: 2017-07-01 | End: 2017-07-04 | Stop reason: HOSPADM

## 2017-07-01 RX ORDER — SIMETHICONE 80 MG
1 TABLET,CHEWABLE ORAL EVERY 6 HOURS PRN
Status: DISCONTINUED | OUTPATIENT
Start: 2017-07-01 | End: 2017-07-04 | Stop reason: HOSPADM

## 2017-07-01 RX ORDER — ADHESIVE BANDAGE
30 BANDAGE TOPICAL 2 TIMES DAILY PRN
Status: DISCONTINUED | OUTPATIENT
Start: 2017-07-02 | End: 2017-07-04 | Stop reason: HOSPADM

## 2017-07-01 RX ORDER — OXYCODONE AND ACETAMINOPHEN 5; 325 MG/1; MG/1
1 TABLET ORAL EVERY 4 HOURS PRN
Status: DISCONTINUED | OUTPATIENT
Start: 2017-07-02 | End: 2017-07-04 | Stop reason: HOSPADM

## 2017-07-01 RX ORDER — IBUPROFEN 600 MG/1
600 TABLET ORAL EVERY 6 HOURS
Status: DISCONTINUED | OUTPATIENT
Start: 2017-07-02 | End: 2017-07-02 | Stop reason: SDUPTHER

## 2017-07-01 RX ORDER — ACETAMINOPHEN 325 MG/1
650 TABLET ORAL EVERY 6 HOURS
Status: COMPLETED | OUTPATIENT
Start: 2017-07-01 | End: 2017-07-02

## 2017-07-01 RX ORDER — METOCLOPRAMIDE HYDROCHLORIDE 5 MG/ML
10 INJECTION INTRAMUSCULAR; INTRAVENOUS ONCE
Status: DISCONTINUED | OUTPATIENT
Start: 2017-07-01 | End: 2017-07-04 | Stop reason: HOSPADM

## 2017-07-01 RX ORDER — ONDANSETRON 2 MG/ML
4 INJECTION INTRAMUSCULAR; INTRAVENOUS EVERY 12 HOURS PRN
Status: DISCONTINUED | OUTPATIENT
Start: 2017-07-01 | End: 2017-07-04 | Stop reason: HOSPADM

## 2017-07-01 RX ORDER — SODIUM CHLORIDE, SODIUM LACTATE, POTASSIUM CHLORIDE, CALCIUM CHLORIDE 600; 310; 30; 20 MG/100ML; MG/100ML; MG/100ML; MG/100ML
INJECTION, SOLUTION INTRAVENOUS CONTINUOUS
Status: ACTIVE | OUTPATIENT
Start: 2017-07-01 | End: 2017-07-02

## 2017-07-01 RX ORDER — MISOPROSTOL 200 UG/1
TABLET ORAL
Status: DISCONTINUED
Start: 2017-07-01 | End: 2017-07-01 | Stop reason: WASHOUT

## 2017-07-01 RX ORDER — FENTANYL CITRATE 50 UG/ML
INJECTION, SOLUTION INTRAMUSCULAR; INTRAVENOUS
Status: DISCONTINUED | OUTPATIENT
Start: 2017-07-01 | End: 2017-07-02

## 2017-07-01 RX ORDER — OXYTOCIN/RINGER'S LACTATE 20/1000 ML
PLASTIC BAG, INJECTION (ML) INTRAVENOUS
Status: DISCONTINUED | OUTPATIENT
Start: 2017-07-01 | End: 2017-07-02

## 2017-07-01 RX ORDER — MISOPROSTOL 200 UG/1
TABLET ORAL
Status: DISPENSED
Start: 2017-07-01 | End: 2017-07-02

## 2017-07-01 RX ADMIN — OXYCODONE HYDROCHLORIDE 10 MG: 5 TABLET ORAL at 11:07

## 2017-07-01 RX ADMIN — PHENYLEPHRINE HYDROCHLORIDE 100 MCG: 10 INJECTION INTRAVENOUS at 05:07

## 2017-07-01 RX ADMIN — Medication 5 MILLION UNITS: at 04:07

## 2017-07-01 RX ADMIN — FENTANYL CITRATE 10 MCG: 50 INJECTION, SOLUTION INTRAMUSCULAR; INTRAVENOUS at 05:07

## 2017-07-01 RX ADMIN — ACETAMINOPHEN 1000 MG: 10 INJECTION, SOLUTION INTRAVENOUS at 05:07

## 2017-07-01 RX ADMIN — SODIUM CITRATE AND CITRIC ACID MONOHYDRATE 30 ML: 500; 334 SOLUTION ORAL at 04:07

## 2017-07-01 RX ADMIN — ONDANSETRON 8 MG: 8 TABLET, ORALLY DISINTEGRATING ORAL at 07:07

## 2017-07-01 RX ADMIN — ONDANSETRON 4 MG: 2 INJECTION INTRAMUSCULAR; INTRAVENOUS at 05:07

## 2017-07-01 RX ADMIN — OXYCODONE HYDROCHLORIDE 10 MG: 5 TABLET ORAL at 07:07

## 2017-07-01 RX ADMIN — ASPIRIN 81 MG: 81 TABLET, CHEWABLE ORAL at 08:07

## 2017-07-01 RX ADMIN — OXYCODONE HYDROCHLORIDE AND ACETAMINOPHEN 1 TABLET: 5; 325 TABLET ORAL at 05:07

## 2017-07-01 RX ADMIN — BUPIVACAINE HYDROCHLORIDE IN DEXTROSE 1.6 ML: 7.5 INJECTION, SOLUTION SUBARACHNOID at 05:07

## 2017-07-01 RX ADMIN — Medication 150 MG: at 08:07

## 2017-07-01 RX ADMIN — BETAMETHASONE SODIUM PHOSPHATE AND BETAMETHASONE ACETATE 12 MG: 3; 3 INJECTION, SUSPENSION INTRA-ARTICULAR; INTRALESIONAL; INTRAMUSCULAR at 08:07

## 2017-07-01 RX ADMIN — CEFAZOLIN SODIUM 2 G: 2 SOLUTION INTRAVENOUS at 05:07

## 2017-07-01 RX ADMIN — Medication 2 UNITS: at 05:07

## 2017-07-01 RX ADMIN — KETOROLAC TROMETHAMINE 30 MG: 30 INJECTION, SOLUTION INTRAMUSCULAR at 11:07

## 2017-07-01 RX ADMIN — DOCUSATE SODIUM 100 MG: 100 CAPSULE, LIQUID FILLED ORAL at 08:07

## 2017-07-01 RX ADMIN — KETOROLAC TROMETHAMINE 30 MG: 30 INJECTION, SOLUTION INTRAMUSCULAR; INTRAVENOUS at 05:07

## 2017-07-01 RX ADMIN — ONDANSETRON 8 MG: 8 TABLET, ORALLY DISINTEGRATING ORAL at 12:07

## 2017-07-01 RX ADMIN — SODIUM CITRATE AND CITRIC ACID MONOHYDRATE 30 ML: 500; 334 SOLUTION ORAL at 12:07

## 2017-07-01 RX ADMIN — Medication 1 UNITS: at 05:07

## 2017-07-01 RX ADMIN — SODIUM CHLORIDE, SODIUM LACTATE, POTASSIUM CHLORIDE, AND CALCIUM CHLORIDE: 600; 310; 30; 20 INJECTION, SOLUTION INTRAVENOUS at 04:07

## 2017-07-01 RX ADMIN — ACETAMINOPHEN 650 MG: 325 TABLET ORAL at 11:07

## 2017-07-01 RX ADMIN — MORPHINE SULFATE 0.15 MG: 0.5 INJECTION, SOLUTION EPIDURAL; INTRATHECAL; INTRAVENOUS at 05:07

## 2017-07-01 RX ADMIN — ONDANSETRON 4 MG: 2 INJECTION INTRAMUSCULAR; INTRAVENOUS at 08:07

## 2017-07-01 RX ADMIN — FAMOTIDINE 20 MG: 10 INJECTION INTRAVENOUS at 04:07

## 2017-07-01 NOTE — ASSESSMENT & PLAN NOTE
- BMZ 1/2 0830 6/30/17. Plan for second dose this AM  - NST BID  - regular diet  - If labors, PCN for GBS status as she has h/o GBS UTI  - No mag for fetal neuroprotection as >32w0d  - MFM US 6/13 -- A: 1819 g 21%; B: 2056 g 35%  - Continue to clinically monitor

## 2017-07-01 NOTE — L&D DELIVERY NOTE
[facility]   Section   Operative Note    SUMMARY      Section Operative Note  Procedure Date: 2017     Procedure: Repeat Low Trasnverse  Section via Pfannenstiel skin incision    Indications:   1. History of C/S  2. Di-Di IUP at 34w6d   3. Non reassuring fetal heart tones     Pre-operative Diagnosis:    1. History of C/S  2. Di-Di IUP at 34w6d   3. Non reassuring fetal heart tones   4. H/o migraines   5. H/o chronic back pain - on opiods BID   6. GBS UTI this IUP   7. H/o post partum eclampsia previous IUP   8. Abnormal quad (declined amnio)        Post-operative Diagnosis: same    Surgeon: Dr. Arnaldo Murray IV     Assistants: Dr. Delores Dunn     Anesthesia: Epidural anesthesia    Findings:   1. Two male infants in vertex/transverse position  2. Normal appearing uterus  3. Normal appearing bilateral fallopian tubes and ovaries    Estimated Blood Loss:  1400mL           Total IV Fluids: 1300 mL     UOP: 700 mL    Specimens: Placentas     PreOp CBC:   Lab Results   Component Value Date    WBC 8.47 2017    HGB 9.7 (L) 2017    HCT 29.2 (L) 2017    MCV 91 2017     2017                     Complications:  None; patient tolerated the procedure well.           Disposition: PACU - hemodynamically stable.           Condition: stable    Procedure Details   The patient was seen in the Antepartum Room, see previous note. The risks, benefits, complications, treatment options, and expected outcomes were discussed with the patient.  The patient concurred with the proposed plan, giving informed consent.  The site of surgery properly noted/marked. The patient was taken to Operating Room, identified as Modesta George and the procedure verified as Repeat Low Transverse  Delivery. A Time Out was held and the above information confirmed.    After induction of anesthesia, the patient was prepped and draped in the usual sterile manner while  placed in a dorsal supine position with a left lateral tilt.  A julien catheter was also placed per nursing. Preoperative antibiotics Ancef 2gIV were administered and an allis test was performed yielding adequate anesthesia.  A Pfannenstiel incision was made and carried down through the subcutaneous tissue to the fascia. Fascial incision was made and extended transversely. The fascia was grasped with Kocher clamps and  from the underlying rectus tissue superiorly and inferiorly. The peritoneum was identified, found to be free of adherent bowl and entered with Metzenbaum scissors. Peritoneal incision was extended longitudinally. The vesico-uterine peritoneum was identified and bladder blade was inserted. The vesico-uterine peritoneum was incised transversely and the bladder flap was bluntly freed from the lower uterine segment. The bladder blade was reinserted to keep the bladder out of the operative field. A low transverse uterine incision was made with knife and extended with with finger fracture. The amniotic sac was ruptured with entrance hemostat and Baby A was noted to be in vertex position. The head was brought to the incision and elevated out of the pelvis.  male infant delivered without difficulty.  Baby weighed 1920 grams with Apgar scores of 9/9 at one and five minutes respectively.  Baby B weighed 2265g grams with APGAR scores of 9/9. After the umbilical cord was clamped and cut cord blood was obtained for evaluation. The placenta was removed intact and appeared normal and was sent to pathology. The uterus was exteriorized. The uterine outline, tubes and ovaries appeared normal. The uterine incision was closed with running locked sutures of #1  chromic. Hemostasis was observed. The uterus was returned to the abdominal cavity. Incision was reinspected and good hemostasis was noted. The abdominal cavity was irrigated to remove clots. The peritoneum and muscle was reapproximated with 2-0 vicryl and  #1 chromic respectively. The fascia was then reapproximated with running sutures of 1-0 PDS. The subcutaneous fat and skin was reapproximated with 2-0 Vicryl and 4-0 monocryl respectively.    Instrument, sponge, and needle counts were correct prior the abdominal closure and at the conclusion of the case.     Pt tolerated procedure well and Dr. Trevor MONSALVE discussed with family that pt was stable and in good condition after the procedure.             Adrian George [[id number]]     Delivery Information for  Adiran George    Birth information:  YOB: 2016   Time of birth: 5:20 PM   Sex: male   Head Delivery Date/Time:     Delivery type:    Gestational Age: 34w6d          [place] Measurements    Weight:  1920 g            Assessment    Apgars:     1 Minute:   5 Minute:   10 Minute:   15 Minute:   20 Minute:     Skin Color:   2  2       Heart Rate:   2  2       Reflex Irritability:   2  2       Muscle Tone:   2  2       Respiratory Effort:   1  1       Total:   9  9                                      Interventions/Resuscitation         Cord    No data filed              Labor Events:       labor:       Labor Onset Date/Time:         Dilation Complete Date/Time:         Start Pushing Date/Time:       Rupture Date/Time:              Rupture type:           Fluid Amount:        Fluid Color:        Fluid Odor:        Membrane Status (PeriCalm):        Rupture Date/Time (PeriCalm):        Fluid Amount (PeriCalm):        Fluid Color (PeriCalm):         steroids:       Antibiotics given for GBS:       Induction:       Indications for induction:        Augmentation:       Indications for augmentation:       Labor complications:       Additional complications:          Cervical ripening:                     Delivery:      Episiotomy:       Indication for Episiotomy:       Perineal Lacerations:   Repaired:      Periurethral Laceration:   Repaired:     Labial Laceration:   Repaired:      Sulcus Laceration:   Repaired:     Vaginal Laceration:   Repaired:     Cervical Laceration:   Repaired:     Repair suture:       Repair # of packets:       Vaginal delivery QBL (mL):        QBL (mL): 0     Combined Blood Loss (mL): 0     Vaginal Sweep Performed:       Surgicount Correct:         Other providers:            Details (if applicable):  Trial of Labor      Categorization:      Priority:     Indications for :     Incision Type:       Additional  information:  Forceps:    Vacuum:    Breech:    Observed anomalies    Other (Comments):            Ariel GLADYS Byers [[id number]]     Delivery Information for GLADYS George    Birth information:  YOB: 2016   Time of birth: 5:21 PM   Sex: male   Head Delivery Date/Time:     Delivery type:    Gestational Age: 34w6d          [place] Measurements    Weight:  2265 g           [place] Assessment    Apgars:     1 Minute:   5 Minute:   10 Minute:   15 Minute:   20 Minute:     Skin Color:   1  1       Heart Rate:   2  2       Reflex Irritability:   2  2       Muscle Tone:   2  2       Respiratory Effort:   2  2       Total:   9  9                                      Interventions/Resuscitation         Cord    No data filed              Labor Events:       labor:       Labor Onset Date/Time:         Dilation Complete Date/Time:         Start Pushing Date/Time:       Rupture Date/Time:              Rupture type:           Fluid Amount:        Fluid Color:        Fluid Odor:        Membrane Status (PeriCalm):        Rupture Date/Time (PeriCalm):        Fluid Amount (PeriCalm):        Fluid Color (PeriCalm):         steroids:       Antibiotics given for GBS:       Induction:       Indications for induction:        Augmentation:       Indications for augmentation:       Labor complications:       Additional complications:          Cervical ripening:                     Delivery:       Episiotomy:       Indication for Episiotomy:       Perineal Lacerations:   Repaired:      Periurethral Laceration:   Repaired:     Labial Laceration:   Repaired:     Sulcus Laceration:   Repaired:     Vaginal Laceration:   Repaired:     Cervical Laceration:   Repaired:     Repair suture:       Repair # of packets:       Vaginal delivery QBL (mL):        QBL (mL): 0     Combined Blood Loss (mL): 0     Vaginal Sweep Performed:       Surgicount Correct:         Other providers:            Details (if applicable):  Trial of Labor      Categorization:      Priority:     Indications for :     Incision Type:       Additional  information:  Forceps:    Vacuum:    Breech:    Observed anomalies    Other (Comments):

## 2017-07-01 NOTE — PROGRESS NOTES
MD to bedside.   FHR deceleraton baby A to 60s x 6 min  Earlier on noon NST FHR deceleration was noted as well  Pt feeling intermittent painful contrations    O:   Temp:  [97.3 °F (36.3 °C)-99 °F (37.2 °C)] 99 °F (37.2 °C)  Pulse:  [] 83  Resp:  [18] 18  SpO2:  [89 %-100 %] 100 %  BP: (113-129)/(57-75) 113/57   Cervix: 2/60/-3 (previously 1)  FHTs: Baby a - recovered, s/p 6 min deceleration, now cat 1  Baby b - Cat 1    Plan:  Discussed w/ MFM on call   Pt at 34w6d s/p BMZ series w/ Malik IUP, transferred from outside facility for prolonged FHR decelerations of Baby A and B.   Today, Baby A late decelerations noted w/ contractions (@1200, again @ 1630)  At this point, we feel delivery is indicated  The patient is in agreement  Proceed to OR     Delores Bach MD PGY-3   Ob-Gyn Resident   # [phone]

## 2017-07-01 NOTE — PLAN OF CARE
Problem: Patient Care Overview  Goal: Plan of Care Review  Outcome: Ongoing (interventions implemented as appropriate)  Pt doing well, no acute changes during this shift, vital signs stable, no signs of distress, pt report positive fetal movement, pt denies vaginal bleeding and LOF. Patient reports having contractions that are very irregular, but have not intensified or worsened.  Will continue to monitor patient.

## 2017-07-01 NOTE — PLAN OF CARE
Problem: Patient Care Overview  Goal: Plan of Care Review  Outcome: Ongoing (interventions implemented as appropriate)  Pt taken to C/s because of recurrent prolonged decels.  VSS.  TEDs/SCDs on, pre-op checklist completed.  Report given to Lindsay

## 2017-07-01 NOTE — SUBJECTIVE & OBJECTIVE
This am, main complaint is HA 5/10 throughout the night. Some response to tylenol. States different from her normal HA. Denies vision changes, SOB. Denies RUQ pain this AM. Reports contractions mostly with standing and ambulating, not persistent. Denies VB, LOF. Good FM. x2.     Objective:     Vital Signs (Most Recent):  Temp: 97.5 °F (36.4 °C) (07/01/17 0711)  Pulse: 85 (07/01/17 0711)  Resp: 18 (06/30/17 1928)  BP: 128/75 (07/01/17 0711)  SpO2: 100 % (07/01/17 0711) Vital Signs (24h Range):  Temp:  [97.2 °F (36.2 °C)-98.2 °F (36.8 °C)] 97.5 °F (36.4 °C)  Pulse:  [] 85  Resp:  [16-19] 18  SpO2:  [89 %-100 %] 100 %  BP: (113-130)/(64-75) 128/75        BMI 26    FHT: A: 130 bpm, Cat 1 (reassuring), B: 135 bpm, Cat 1 (reassuring)  TOCO:  Q 5-8 minutes      Intake/Output Summary (Last 24 hours) at 07/01/17 0824  Last data filed at 07/01/17 0400   Gross per 24 hour   Intake          1735.41 ml   Output              200 ml   Net          1535.41 ml       Cervical Exam: 1530 6/30/17  Dilation:  1  Effacement:  50%  Station: -3  Presentation: Vertex/breech     Significant Labs:    Recent Labs  Lab 06/30/17  1210   WBC 8.47   HGB 9.7*   HCT 29.2*   MCV 91           Recent Labs  Lab 06/30/17  1210 06/30/17  1215     --    K 3.8  --      --    CO2 21*  --    BUN 4*  --    CREATININE 0.6  --    GLU 59*  --    PROT 6.2  --    BILITOT 0.5  --    ALKPHOS 154*  --    ALT 12  --    AST 17  --    UTPCR  --  0.15        Physical Exam:   Constitutional: She is oriented to person, place, and time. She appears well-developed and well-nourished.    HENT:   Head: Normocephalic.   Right Ear: External ear normal.   Left Ear: External ear normal.      Cardiovascular: Normal rate, regular rhythm and normal heart sounds.     Pulmonary/Chest: Effort normal and breath sounds normal. No respiratory distress. She has no wheezes. She has no rales.        Abdominal: Soft. She exhibits no distension and no abdominal incision.    Gravid uterus             Musculoskeletal: Moves all extremeties. She exhibits no edema.       Neurological: She is alert and oriented to person, place, and time. She has normal reflexes.     Psychiatric: She has a normal mood and affect. Her behavior is normal. Judgment and thought content normal.

## 2017-07-01 NOTE — ASSESSMENT & PLAN NOTE
- Pre E labs wnl on admission  - BP: (113-130)/(64-75) 128/75  - Continue to monitor vitals q4  - consider repeat labs today if HA persists despite medications

## 2017-07-01 NOTE — ASSESSMENT & PLAN NOTE
- states she takes percocet 5mg BID for this, continued here  - HA this AM, states feels different  - will give percocet as this is her usual HA regimen, although follow closely due to h/o eclampsia

## 2017-07-01 NOTE — PROGRESS NOTES
[facility]  Obstetrics  Antepartum Progress Note    Patient Name: Modesta George  MRN: [id number]  Admission Date: 2017  Hospital Length of Stay: 0 days  Attending Physician: Nora Pineda MD  Primary Care Provider: [facility]    Subjective:     Principal Problem:Non-reassuring electronic fetal monitoring tracing    HPI:  Modesta George is a 31 y.o. [id number] Di-Di twins at 34w5d presents from outside hospital ([facility]) with category 2 strip for both fetal tracings. Patient has history of  section due to non reassuring fetal heart tones and history of post partum Eclampsia. Denies HA, vision changes, SOB. Does endorse RUQ pain intermittent over past few days.     This IUP is complicated by GBS UTI and abnormal quad screen earlier in pregnancy and declined confirmatory testing. Patient reports migraines and backpain that currently treated with Percocet. Baby B previously polyhydramniotic but on newest US MVP 7.8cm    Hospital Course:  2017 - Transferred from outside hospital. Placed on continuous fetal monitoring, deceleration x1 noted, but overall remained reassuring. C/o ctx but cervix remained unchanged. Ctx subsided and she was deescalated to NST BID.    This am, main complaint is HA 5/10 throughout the night. Some response to tylenol. States different from her normal HA. Denies vision changes, SOB. Denies RUQ pain this AM. Reports contractions mostly with standing and ambulating, not persistent. Denies VB, LOF. Good FM. x2.     Objective:     Vital Signs (Most Recent):  Temp: 97.5 °F (36.4 °C) (17)  Pulse: 85 (17)  Resp: 18 (17)  BP: 128/75 (17)  SpO2: 100 % (17) Vital Signs (24h Range):  Temp:  [97.2 °F (36.2 °C)-98.2 °F (36.8 °C)] 97.5 °F (36.4 °C)  Pulse:  [] 85  Resp:  [16-19] 18  SpO2:  [89 %-100 %] 100 %  BP: (113-130)/(64-75) 128/75        BMI 26    FHT: A: 130 bpm, Cat 1 (reassuring), B:  135 bpm, Cat 1 (reassuring)  TOCO:  Q 5-8 minutes      Intake/Output Summary (Last 24 hours) at 17 0824  Last data filed at 17 0400   Gross per 24 hour   Intake          1735.41 ml   Output              200 ml   Net          1535.41 ml       Cervical Exam: 1530 17  Dilation:  1  Effacement:  50%  Station: -3  Presentation: Vertex/breech     Significant Labs:    Recent Labs  Lab 17  1210   WBC 8.47   HGB 9.7*   HCT 29.2*   MCV 91           Recent Labs  Lab 17  1210 17  1215     --    K 3.8  --      --    CO2 21*  --    BUN 4*  --    CREATININE 0.6  --    GLU 59*  --    PROT 6.2  --    BILITOT 0.5  --    ALKPHOS 154*  --    ALT 12  --    AST 17  --    UTPCR  --  0.15        Physical Exam:   Constitutional: She is oriented to person, place, and time. She appears well-developed and well-nourished.    HENT:   Head: Normocephalic.   Right Ear: External ear normal.   Left Ear: External ear normal.      Cardiovascular: Normal rate, regular rhythm and normal heart sounds.     Pulmonary/Chest: Effort normal and breath sounds normal. No respiratory distress. She has no wheezes. She has no rales.        Abdominal: Soft. She exhibits no distension and no abdominal incision.   Gravid uterus             Musculoskeletal: Moves all extremeties. She exhibits no edema.       Neurological: She is alert and oriented to person, place, and time. She has normal reflexes.     Psychiatric: She has a normal mood and affect. Her behavior is normal. Judgment and thought content normal.       Assessment/Plan:     31 y.o. female  at 34w6d for:    History of postpartum eclampsia    - Pre E labs wnl on admission  - BP: (113-130)/(64-75) 128/75  - Continue to monitor vitals q4  - consider repeat labs today if HA persists despite medications        Abnormal quad screen    - Stable. Declined confirmatory testing. No intervention.         Hx of migraine headaches    - states she takes  percocet 5mg BID for this, continued here  - HA this AM, states feels different  - will give percocet as this is her usual HA regimen, although follow closely due to h/o eclampsia        Dichorionic diamniotic twin pregnancy in third trimester    - BMZ 1/2 0830 6/30/17. Plan for second dose this AM  - NST BID  - regular diet  - If labors, PCN for GBS status as she has h/o GBS UTI  - No mag for fetal neuroprotection as >32w0d  - [facility] 6/13 -- A: 1819 g 21%; B: 2056 g 35%  - Continue to clinically monitor        * Non-reassuring electronic fetal monitoring tracing    - FHT overall reassuring here  - continue with NST BID unless prolonged becomes indicated              Geeta Aguilar MD  Obstetrics  [facility]

## 2017-07-01 NOTE — ANESTHESIA PROCEDURE NOTES
Spinal    Diagnosis: Twin gestation  Patient location during procedure: OR  Start time: 7/1/2017 5:01 PM  Timeout: 7/1/2017 5:00 PM  End time: 7/1/2017 5:03 PM  Staffing  Anesthesiologist: JESSENIA KRAMER  Resident/CRNA: TATYANA CASTANEDA  Performed: resident/CRNA   Preanesthetic Checklist  Completed: patient identified, site marked, surgical consent, pre-op evaluation, timeout performed, IV checked, risks and benefits discussed and monitors and equipment checked  Spinal Block  Patient position: sitting  Prep: ChloraPrep  Patient monitoring: heart rate, cardiac monitor and continuous pulse ox  Approach: midline  Location: L3-4  Injection technique: single shot  CSF Fluid: clear free-flowing CSF  Needle  Needle type: Quincke   Needle gauge: 25 G  Needle length: 3.5 in  Additional Documentation: incremental injection, negative aspiration for heme and no paresthesia on injection  Needle localization: anatomical landmarks  Assessment  Sensory level: T4   Dermatomal levels determined by pinch or prick  Ease of block: easy  Patient's tolerance of the procedure: comfortable throughout block and no complaints  Medications:  Bolus administered: 1.6 mL of 0.75 and with dextrose bupivacaine  Opioid administered: 10 mcg of   fentanyl

## 2017-07-01 NOTE — TRANSFER OF CARE
"Anesthesia Transfer of Care Note    Patient: Modesta George    Procedure(s) Performed: Procedure(s) (LRB):  DELIVERY- SECTION (N/A)    Patient location: Labor and Delivery    Anesthesia Type: spinal    Transport from OR: Transported from OR on room air with adequate spontaneous ventilation    Post pain: adequate analgesia    Post assessment: no apparent anesthetic complications and tolerated procedure well    Post vital signs: stable    Level of consciousness: awake, alert and oriented    Nausea/Vomiting: no nausea/vomiting    Complications: none    Transfer of care protocol was followed      Last vitals:   Visit Vitals  BP (!) 113/57   Pulse 83   Temp 37.2 °C (99 °F)   Resp 18   Ht 5' 7" (1.702 m)   Wt 83 kg (183 lb)   SpO2 100%   Breastfeeding? No   BMI 28.66 kg/m²     "

## 2017-07-02 LAB
ALBUMIN SERPL BCP-MCNC: 2.5 G/DL
ALP SERPL-CCNC: 133 U/L
ALT SERPL W/O P-5'-P-CCNC: 14 U/L
ANION GAP SERPL CALC-SCNC: 8 MMOL/L
AST SERPL-CCNC: 25 U/L
BASOPHILS # BLD AUTO: 0.01 K/UL
BASOPHILS # BLD AUTO: 0.01 K/UL
BASOPHILS NFR BLD: 0.1 %
BASOPHILS NFR BLD: 0.1 %
BILIRUB SERPL-MCNC: 0.3 MG/DL
BUN SERPL-MCNC: 5 MG/DL
CALCIUM SERPL-MCNC: 8.9 MG/DL
CHLORIDE SERPL-SCNC: 108 MMOL/L
CO2 SERPL-SCNC: 23 MMOL/L
CREAT SERPL-MCNC: 0.7 MG/DL
CREAT UR-MCNC: 97.7 MG/DL
DIFFERENTIAL METHOD: ABNORMAL
DIFFERENTIAL METHOD: ABNORMAL
EOSINOPHIL # BLD AUTO: 0 K/UL
EOSINOPHIL # BLD AUTO: 0 K/UL
EOSINOPHIL NFR BLD: 0 %
EOSINOPHIL NFR BLD: 0.1 %
ERYTHROCYTE [DISTWIDTH] IN BLOOD BY AUTOMATED COUNT: 13.8 %
ERYTHROCYTE [DISTWIDTH] IN BLOOD BY AUTOMATED COUNT: 13.8 %
EST. GFR  (AFRICAN AMERICAN): >60 ML/MIN/1.73 M^2
EST. GFR  (NON AFRICAN AMERICAN): >60 ML/MIN/1.73 M^2
GLUCOSE SERPL-MCNC: 89 MG/DL
HCT VFR BLD AUTO: 25.9 %
HCT VFR BLD AUTO: 25.9 %
HGB BLD-MCNC: 8.6 G/DL
HGB BLD-MCNC: 8.6 G/DL
LYMPHOCYTES # BLD AUTO: 1.6 K/UL
LYMPHOCYTES # BLD AUTO: 2.1 K/UL
LYMPHOCYTES NFR BLD: 13.3 %
LYMPHOCYTES NFR BLD: 9.5 %
MCH RBC QN AUTO: 29.8 PG
MCH RBC QN AUTO: 30 PG
MCHC RBC AUTO-ENTMCNC: 33.2 %
MCHC RBC AUTO-ENTMCNC: 33.2 %
MCV RBC AUTO: 90 FL
MCV RBC AUTO: 90 FL
MONOCYTES # BLD AUTO: 1 K/UL
MONOCYTES # BLD AUTO: 1.1 K/UL
MONOCYTES NFR BLD: 6.3 %
MONOCYTES NFR BLD: 6.8 %
NEUTROPHILS # BLD AUTO: 12.6 K/UL
NEUTROPHILS # BLD AUTO: 13.7 K/UL
NEUTROPHILS NFR BLD: 79.3 %
NEUTROPHILS NFR BLD: 82.6 %
PLATELET # BLD AUTO: 174 K/UL
PLATELET # BLD AUTO: 209 K/UL
PMV BLD AUTO: 9.3 FL
PMV BLD AUTO: 9.4 FL
POTASSIUM SERPL-SCNC: 4.3 MMOL/L
PROT SERPL-MCNC: 5.7 G/DL
PROT UR-MCNC: 14 MG/DL
PROT/CREAT RATIO, UR: 0.14
RBC # BLD AUTO: 2.87 M/UL
RBC # BLD AUTO: 2.89 M/UL
SODIUM SERPL-SCNC: 139 MMOL/L
WBC # BLD AUTO: 15.84 K/UL
WBC # BLD AUTO: 16.58 K/UL

## 2017-07-02 PROCEDURE — 36415 COLL VENOUS BLD VENIPUNCTURE: CPT

## 2017-07-02 PROCEDURE — 63600175 PHARM REV CODE 636 W HCPCS: Performed by: ANESTHESIOLOGY

## 2017-07-02 PROCEDURE — 25000003 PHARM REV CODE 250: Performed by: STUDENT IN AN ORGANIZED HEALTH CARE EDUCATION/TRAINING PROGRAM

## 2017-07-02 PROCEDURE — 25000003 PHARM REV CODE 250: Performed by: ANESTHESIOLOGY

## 2017-07-02 PROCEDURE — 80053 COMPREHEN METABOLIC PANEL: CPT

## 2017-07-02 PROCEDURE — 11000001 HC ACUTE MED/SURG PRIVATE ROOM

## 2017-07-02 PROCEDURE — 84156 ASSAY OF PROTEIN URINE: CPT

## 2017-07-02 PROCEDURE — 99231 SBSQ HOSP IP/OBS SF/LOW 25: CPT | Mod: 25,,, | Performed by: OBSTETRICS & GYNECOLOGY

## 2017-07-02 PROCEDURE — 85025 COMPLETE CBC W/AUTO DIFF WBC: CPT

## 2017-07-02 RX ORDER — BISACODYL 10 MG
10 SUPPOSITORY, RECTAL RECTAL DAILY PRN
Status: DISCONTINUED | OUTPATIENT
Start: 2017-07-02 | End: 2017-07-04 | Stop reason: HOSPADM

## 2017-07-02 RX ORDER — IBUPROFEN 600 MG/1
600 TABLET ORAL EVERY 6 HOURS
Status: DISCONTINUED | OUTPATIENT
Start: 2017-07-03 | End: 2017-07-04 | Stop reason: HOSPADM

## 2017-07-02 RX ORDER — KETOROLAC TROMETHAMINE 30 MG/ML
30 INJECTION, SOLUTION INTRAMUSCULAR; INTRAVENOUS EVERY 6 HOURS
Status: COMPLETED | OUTPATIENT
Start: 2017-07-02 | End: 2017-07-02

## 2017-07-02 RX ADMIN — DOCUSATE SODIUM 200 MG: 100 CAPSULE, LIQUID FILLED ORAL at 10:07

## 2017-07-02 RX ADMIN — KETOROLAC TROMETHAMINE 30 MG: 30 INJECTION, SOLUTION INTRAMUSCULAR at 10:07

## 2017-07-02 RX ADMIN — ACETAMINOPHEN 650 MG: 325 TABLET ORAL at 06:07

## 2017-07-02 RX ADMIN — ACETAMINOPHEN 650 MG: 325 TABLET ORAL at 09:07

## 2017-07-02 RX ADMIN — DOCUSATE SODIUM 200 MG: 100 CAPSULE, LIQUID FILLED ORAL at 09:07

## 2017-07-02 RX ADMIN — OXYCODONE HYDROCHLORIDE 10 MG: 5 TABLET ORAL at 03:07

## 2017-07-02 RX ADMIN — Medication 150 MG: at 10:07

## 2017-07-02 RX ADMIN — KETOROLAC TROMETHAMINE 30 MG: 30 INJECTION, SOLUTION INTRAMUSCULAR at 05:07

## 2017-07-02 RX ADMIN — ACETAMINOPHEN 650 MG: 325 TABLET ORAL at 03:07

## 2017-07-02 RX ADMIN — OXYCODONE HYDROCHLORIDE 10 MG: 5 TABLET ORAL at 06:07

## 2017-07-02 RX ADMIN — OXYCODONE HYDROCHLORIDE 10 MG: 5 TABLET ORAL at 10:07

## 2017-07-02 RX ADMIN — OXYCODONE HYDROCHLORIDE 10 MG: 5 TABLET ORAL at 09:07

## 2017-07-02 RX ADMIN — DOCUSATE SODIUM 200 MG: 100 CAPSULE, LIQUID FILLED ORAL at 12:07

## 2017-07-02 RX ADMIN — BISACODYL 10 MG: 10 SUPPOSITORY RECTAL at 10:07

## 2017-07-02 NOTE — PROGRESS NOTES
[facility]  Obstetrics  Postpartum Progress Note    Patient Name: Modesta George  MRN: [id number]  Admission Date: 6/30/2017  Hospital Length of Stay: 1 days  Attending Physician: Nora Pineda MD  Primary Care Provider: [facility]    Subjective:     Principal Problem:Non-reassuring electronic fetal monitoring tracing    Hospital course: 06/30/2017 - Transferred from outside hospital. Placed on continuous fetal monitoring, deceleration x1 noted, but overall remained reassuring. C/o ctx but cervix remained unchanged. Ctx subsided and she was deescalated to NST BID.  07/01/2017 - RLTCS or non-reassuring fetal surveillance, uncomplicated. Both twins in NICU.    Interval History:   POD 1. She is doing well this morning. She is tolerating a regular diet without nausea or vomiting. She is not voiding spontaneously (still has julien in place). She is not ambulating. She has passed flatus, and has not a BM and feels quite constipated. Vaginal bleeding is mild. She denies fever or chills. Abdominal pain is mild and controlled with IV/oral medications. She is breastfeeding, currently pumping.     Objective:     Vital Signs (Most Recent):  Temp: 98 °F (36.7 °C) (07/02/17 0424)  Pulse: (!) 58 (07/02/17 0424)  Resp: 18 (07/02/17 0424)  BP: 115/67 (07/02/17 0424)  SpO2: 99 % (07/01/17 2026) Vital Signs (24h Range):  Temp:  [97.2 °F (36.2 °C)-99 °F (37.2 °C)] 98 °F (36.7 °C)  Pulse:  [] 58  Resp:  [16-18] 18  SpO2:  [98 %-100 %] 99 %  BP: (113-183)/(56-89) 115/67     Weight: 83 kg (183 lb)  Body mass index is 28.66 kg/m².      Intake/Output Summary (Last 24 hours) at 07/02/17 0542  Last data filed at 07/01/17 2030   Gross per 24 hour   Intake              650 ml   Output             1050 ml   Net             -400 ml       Significant Labs:  Lab Results   Component Value Date    GROUPTRH B POS 06/30/2017       Recent Labs  Lab 06/30/17  1210   HGB 9.7*   HCT 29.2*       I have personallly  reviewed all pertinent lab results from the last 24 hours.    Physical Exam:   Constitutional: She is oriented to person, place, and time. She appears well-developed and well-nourished. No distress.    HENT:   Head: Normocephalic and atraumatic.      Cardiovascular: Normal rate and regular rhythm.     Pulmonary/Chest: Effort normal.        Abdominal: Soft. Bowel sounds are normal. She exhibits abdominal incision (c/d/i). She exhibits no distension. There is tenderness (appropriate). There is no rebound and no guarding.     Genitourinary:   Genitourinary Comments: Fundus firm, below umbilicus           Musculoskeletal: She exhibits no edema.       Neurological: She is alert and oriented to person, place, and time.    Skin: Skin is warm and dry.    Psychiatric: She has a normal mood and affect.       Assessment/Plan:     31 y.o. female  for:    S/P repeat low transverse     - Patient doing well. Continue routine management and advances.  - Continue PO pain meds. Pain well controlled.  - Heme: H/h   - Encourage ambulation  - Contraception per primary OB  - Lactation consult prn  - Rh pos        History of postpartum eclampsia    - Pre E labs wnl on admission  - BP: (113-183)/(56-89) 115/67  - Continue to monitor vitals q4  - asymptomatic        Abnormal quad screen    - Stable. Declined confirmatory testing. No intervention.                             * Non-reassuring electronic fetal monitoring tracing    - FHT overall reassuring here  - continue with NST BID unless prolonged becomes indicated            Disposition: As patient meets milestones, will plan to discharge POD 3-4.    Jacqui Quintanilla MD  Obstetrics  [facility]

## 2017-07-02 NOTE — PROGRESS NOTES
Patient unable to void at 1030, does not feel the urge at this time, instructed pt to increase PO fluid intake and will try again at 1130. Pt verbalizes understanding. Dr. Aguilar placed new orders for urine PC ratio at 1100, & MD notified that unable to void, ok to drain bladder with straight catheter & restart voiding trial.

## 2017-07-02 NOTE — ASSESSMENT & PLAN NOTE
- Pre E labs wnl on admission  - BP: (113-183)/(56-89) 115/67  - Continue to monitor vitals q4  - asymptomatic

## 2017-07-02 NOTE — ASSESSMENT & PLAN NOTE
- Patient doing well. Continue routine management and advances.  - Continue PO pain meds. Pain well controlled.  - Heme: H/h 9/27  - Encourage ambulation  - Contraception per primary OB  - Lactation consult prn  - Rh pos

## 2017-07-02 NOTE — PLAN OF CARE
Problem: Patient Care Overview  Goal: Plan of Care Review  Outcome: Ongoing (interventions implemented as appropriate)  VSS. Fundus firm with moderate bleeding. Graham catheter draining adequate amount of urine. Pain controlled with pain regimen. Infant twins in NICU; Patient is pumping frequently. No distress or discomfort at this time.

## 2017-07-02 NOTE — SUBJECTIVE & OBJECTIVE
Hospital course: 06/30/2017 - Transferred from outside hospital. Placed on continuous fetal monitoring, deceleration x1 noted, but overall remained reassuring. C/o ctx but cervix remained unchanged. Ctx subsided and she was deescalated to NST BID.  07/01/2017 - RLTCS or non-reassuring fetal surveillance, uncomplicated. Both twins in NICU.    Interval History:   POD 1. She is doing well this morning. She is tolerating a regular diet without nausea or vomiting. She is not voiding spontaneously (still has julien in place). She is not ambulating. She has passed flatus, and has not a BM and feels quite constipated. Vaginal bleeding is mild. She denies fever or chills. Abdominal pain is mild and controlled with IV/oral medications. She is breastfeeding, currently pumping.     Objective:     Vital Signs (Most Recent):  Temp: 98 °F (36.7 °C) (07/02/17 0424)  Pulse: (!) 58 (07/02/17 0424)  Resp: 18 (07/02/17 0424)  BP: 115/67 (07/02/17 0424)  SpO2: 99 % (07/01/17 2026) Vital Signs (24h Range):  Temp:  [97.2 °F (36.2 °C)-99 °F (37.2 °C)] 98 °F (36.7 °C)  Pulse:  [] 58  Resp:  [16-18] 18  SpO2:  [98 %-100 %] 99 %  BP: (113-183)/(56-89) 115/67     Weight: 83 kg (183 lb)  Body mass index is 28.66 kg/m².      Intake/Output Summary (Last 24 hours) at 07/02/17 0542  Last data filed at 07/01/17 2030   Gross per 24 hour   Intake              650 ml   Output             1050 ml   Net             -400 ml       Significant Labs:  Lab Results   Component Value Date    GROUPTRH B POS 06/30/2017       Recent Labs  Lab 06/30/17  1210   HGB 9.7*   HCT 29.2*       I have personallly reviewed all pertinent lab results from the last 24 hours.    Physical Exam:   Constitutional: She is oriented to person, place, and time. She appears well-developed and well-nourished. No distress.    HENT:   Head: Normocephalic and atraumatic.      Cardiovascular: Normal rate and regular rhythm.     Pulmonary/Chest: Effort normal.        Abdominal: Soft.  Bowel sounds are normal. She exhibits abdominal incision (c/d/i). She exhibits no distension. There is tenderness (appropriate). There is no rebound and no guarding.     Genitourinary:   Genitourinary Comments: Fundus firm, below umbilicus           Musculoskeletal: She exhibits no edema.       Neurological: She is alert and oriented to person, place, and time.    Skin: Skin is warm and dry.    Psychiatric: She has a normal mood and affect.

## 2017-07-02 NOTE — ANESTHESIA POSTPROCEDURE EVALUATION
"Anesthesia Post Evaluation    Patient: Modesta George    Procedure(s) Performed: Procedure(s) (LRB):  DELIVERY- SECTION (N/A)    Final Anesthesia Type: spinal  Patient location during evaluation: labor & delivery  Patient participation: Yes- Able to Participate  Level of consciousness: awake and alert and oriented  Post-procedure vital signs: reviewed and stable  Pain management: adequate  Airway patency: patent  PONV status at discharge: No PONV  Anesthetic complications: no      Cardiovascular status: blood pressure returned to baseline, hemodynamically stable and stable  Respiratory status: unassisted, spontaneous ventilation and room air  Hydration status: euvolemic  Follow-up not needed.        Visit Vitals  /66 (Patient Position: Sitting, BP Method: Automatic)   Pulse 63   Temp 36.8 °C (98.3 °F) (Oral)   Resp 18   Ht 5' 7" (1.702 m)   Wt 83 kg (183 lb)   SpO2 100%   Breastfeeding? Yes   BMI 28.66 kg/m²       Pain/Bolivar Score: Pain Rating Prior to Med Admin: 7 (2017  3:41 PM)  Pain Rating Post Med Admin: 7 (2017  3:41 PM)      "

## 2017-07-02 NOTE — LACTATION NOTE
LC called to room to start mother pumping for her NICU baby. Gave mother NICU Blue folder for lactation. Showed mother how to work pump, how to keep track of pumpings, how to label nicu breastmilk, how to clean pump parts and bring milk to NICU even if it is only a drop of milk. NICU uses mother's milk for mouth care so even small amounts are ok to bring to NICU. Mother aware to pump 8 or more times a day. Showed mother how to use Symphony pump on premie setting. Call RN with any further questions.

## 2017-07-02 NOTE — PROGRESS NOTES
Unable to start new IV, attempt x3 by 2 RN's. Anesthesia notified and changed Toradol order to IM, pt ok with this change.

## 2017-07-03 PROBLEM — O36.8390 NON-REASSURING ELECTRONIC FETAL MONITORING TRACING: Status: RESOLVED | Noted: 2017-06-30 | Resolved: 2017-07-03

## 2017-07-03 PROBLEM — O30.043 DICHORIONIC DIAMNIOTIC TWIN PREGNANCY IN THIRD TRIMESTER: Status: RESOLVED | Noted: 2017-01-30 | Resolved: 2017-07-03

## 2017-07-03 LAB — BACTERIA UR CULT: NO GROWTH

## 2017-07-03 PROCEDURE — 25000003 PHARM REV CODE 250: Performed by: STUDENT IN AN ORGANIZED HEALTH CARE EDUCATION/TRAINING PROGRAM

## 2017-07-03 PROCEDURE — 11000001 HC ACUTE MED/SURG PRIVATE ROOM

## 2017-07-03 PROCEDURE — 25000003 PHARM REV CODE 250: Performed by: OBSTETRICS & GYNECOLOGY

## 2017-07-03 RX ORDER — OXYCODONE AND ACETAMINOPHEN 5; 325 MG/1; MG/1
1 TABLET ORAL EVERY 4 HOURS PRN
Qty: 25 TABLET | Refills: 0 | Status: SHIPPED | OUTPATIENT
Start: 2017-07-03

## 2017-07-03 RX ORDER — DOCUSATE SODIUM 100 MG/1
200 CAPSULE, LIQUID FILLED ORAL 2 TIMES DAILY
Refills: 0 | COMMUNITY
Start: 2017-07-03

## 2017-07-03 RX ORDER — IBUPROFEN 600 MG/1
600 TABLET ORAL EVERY 6 HOURS PRN
Qty: 30 TABLET | Refills: 1 | Status: SHIPPED | OUTPATIENT
Start: 2017-07-03

## 2017-07-03 RX ADMIN — DOCUSATE SODIUM 200 MG: 100 CAPSULE, LIQUID FILLED ORAL at 01:07

## 2017-07-03 RX ADMIN — IBUPROFEN 600 MG: 600 TABLET, FILM COATED ORAL at 06:07

## 2017-07-03 RX ADMIN — DOCUSATE SODIUM 200 MG: 100 CAPSULE, LIQUID FILLED ORAL at 09:07

## 2017-07-03 RX ADMIN — Medication 150 MG: at 01:07

## 2017-07-03 RX ADMIN — OXYCODONE HYDROCHLORIDE AND ACETAMINOPHEN 1 TABLET: 10; 325 TABLET ORAL at 01:07

## 2017-07-03 RX ADMIN — OXYCODONE HYDROCHLORIDE AND ACETAMINOPHEN 1 TABLET: 10; 325 TABLET ORAL at 06:07

## 2017-07-03 RX ADMIN — OXYCODONE HYDROCHLORIDE AND ACETAMINOPHEN 1 TABLET: 10; 325 TABLET ORAL at 05:07

## 2017-07-03 RX ADMIN — SIMETHICONE CHEW TAB 80 MG 80 MG: 80 TABLET ORAL at 05:07

## 2017-07-03 RX ADMIN — OXYCODONE HYDROCHLORIDE AND ACETAMINOPHEN 1 TABLET: 10; 325 TABLET ORAL at 11:07

## 2017-07-03 RX ADMIN — OXYCODONE HYDROCHLORIDE AND ACETAMINOPHEN 1 TABLET: 5; 325 TABLET ORAL at 02:07

## 2017-07-03 RX ADMIN — IBUPROFEN 600 MG: 600 TABLET, FILM COATED ORAL at 01:07

## 2017-07-03 RX ADMIN — IBUPROFEN 600 MG: 600 TABLET, FILM COATED ORAL at 09:07

## 2017-07-03 NOTE — PROGRESS NOTES
[facility]  Obstetrics  Postpartum Progress Note    Patient Name: Modesta George  MRN: [id number]  Admission Date: 6/30/2017  Hospital Length of Stay: 2 days  Attending Physician: Nora Pineda MD  Primary Care Provider: [facility]    Subjective:     Principal Problem:Non-reassuring electronic fetal monitoring tracing    Hospital course: 06/30/2017 - Transferred from outside hospital. Placed on continuous fetal monitoring, deceleration x1 noted, but overall remained reassuring. C/o ctx but cervix remained unchanged. Ctx subsided and she was deescalated to NST BID.  07/01/2017 - RLTCS or non-reassuring fetal surveillance, uncomplicated. Both twins in NICU.  07/03/2017 - Doing well, meeting milestones.     Interval History:     She is doing well this morning. She is tolerating a regular diet without nausea or vomiting. She is voiding spontaneously. She is ambulating. She has passed flatus, and has not a BM. Vaginal bleeding is mild. She denies fever or chills. Abdominal pain is mild and controlled with oral medications. She is breastfeeding. She desires circumcision for her male baby: not applicable. Twin boys are in NICU.    Objective:     Vital Signs (Most Recent):  Temp: 97.7 °F (36.5 °C) (07/03/17 0357)  Pulse: 66 (07/03/17 0357)  Resp: 18 (07/03/17 0357)  BP: 133/76 (07/03/17 0357)  SpO2: 100 % (07/03/17 0015) Vital Signs (24h Range):  Temp:  [97.7 °F (36.5 °C)-98.3 °F (36.8 °C)] 97.7 °F (36.5 °C)  Pulse:  [57-70] 66  Resp:  [16-18] 18  SpO2:  [98 %-100 %] 100 %  BP: (107-133)/(64-76) 133/76     Weight: 83 kg (183 lb)  Body mass index is 28.66 kg/m².      Intake/Output Summary (Last 24 hours) at 07/03/17 0717  Last data filed at 07/03/17 0200   Gross per 24 hour   Intake             1200 ml   Output             1680 ml   Net             -480 ml       Significant Labs:  Lab Results   Component Value Date    GROUPTRH B POS 06/30/2017       Recent Labs  Lab 07/02/17  1145   HGB  8.6*   HCT 25.9*       CBC:   Recent Labs  Lab 17  1145   WBC 15.84*   RBC 2.89*   HGB 8.6*   HCT 25.9*      MCV 90   MCH 29.8   MCHC 33.2     CMP:   Recent Labs  Lab 17  1145   GLU 89   CALCIUM 8.9   ALBUMIN 2.5*   PROT 5.7*      K 4.3   CO2 23      BUN 5*   CREATININE 0.7   ALKPHOS 133   ALT 14   AST 25   BILITOT 0.3       Physical Exam:   Constitutional: She is oriented to person, place, and time. She appears well-developed and well-nourished. No distress.    HENT:   Head: Normocephalic and atraumatic.      Cardiovascular: Normal rate, regular rhythm and normal heart sounds.     Pulmonary/Chest: Effort normal and breath sounds normal.        Abdominal: Soft. Bowel sounds are normal. She exhibits no distension. There is no tenderness.   Fundus firm below umbilicus  C/S incision clean w/o soi              Musculoskeletal: She exhibits no edema.        Right shoulder: She exhibits no swelling.       Neurological: She is alert and oriented to person, place, and time.    Skin: Skin is warm and dry.    Psychiatric: She has a normal mood and affect.       Assessment/Plan:     31 y.o. female  for:    S/P repeat low transverse     - Patient doing well. Continue routine management and advances.  - Continue PO pain meds. Pain well controlled.  - Heme: H/h   - Encourage ambulation  - Contraception requested before discharge  - Lactation consult prn  - Rh pos        History of postpartum eclampsia    - Pre E labs wnl on admission  - BP:(107-133)/(64-76) 133/76  115/67  - asymptomatic        Abnormal quad screen    - Stable. Declined confirmatory testing. No intervention.         Hx of migraine headaches    - No HA this AM              Disposition: As patient meets milestones, will plan to discharge tomorrow after staff rounds.    Janett Loera MD  Obstetrics  [facility]

## 2017-07-03 NOTE — ASSESSMENT & PLAN NOTE
- Patient doing well. Continue routine management and advances.  - Continue PO pain meds. Pain well controlled.  - Heme: H/h 9/27  - Encourage ambulation  - Contraception requested before discharge  - Lactation consult prn  - Rh pos

## 2017-07-03 NOTE — SUBJECTIVE & OBJECTIVE
Hospital course: 06/30/2017 - Transferred from outside hospital. Placed on continuous fetal monitoring, deceleration x1 noted, but overall remained reassuring. C/o ctx but cervix remained unchanged. Ctx subsided and she was deescalated to NST BID.  07/01/2017 - RLTCS or non-reassuring fetal surveillance, uncomplicated. Both twins in NICU.  07/03/2017 - Doing well, meeting milestones.     Interval History:     She is doing well this morning. She is tolerating a regular diet without nausea or vomiting. She is voiding spontaneously. She is ambulating. She has passed flatus, and has not a BM. Vaginal bleeding is mild. She denies fever or chills. Abdominal pain is mild and controlled with oral medications. She is breastfeeding. She desires circumcision for her male baby: not applicable. Twin boys are in NICU.    Objective:     Vital Signs (Most Recent):  Temp: 97.7 °F (36.5 °C) (07/03/17 0357)  Pulse: 66 (07/03/17 0357)  Resp: 18 (07/03/17 0357)  BP: 133/76 (07/03/17 0357)  SpO2: 100 % (07/03/17 0015) Vital Signs (24h Range):  Temp:  [97.7 °F (36.5 °C)-98.3 °F (36.8 °C)] 97.7 °F (36.5 °C)  Pulse:  [57-70] 66  Resp:  [16-18] 18  SpO2:  [98 %-100 %] 100 %  BP: (107-133)/(64-76) 133/76     Weight: 83 kg (183 lb)  Body mass index is 28.66 kg/m².      Intake/Output Summary (Last 24 hours) at 07/03/17 0717  Last data filed at 07/03/17 0200   Gross per 24 hour   Intake             1200 ml   Output             1680 ml   Net             -480 ml       Significant Labs:  Lab Results   Component Value Date    GROUPTRH B POS 06/30/2017       Recent Labs  Lab 07/02/17  1145   HGB 8.6*   HCT 25.9*       CBC:   Recent Labs  Lab 07/02/17  1145   WBC 15.84*   RBC 2.89*   HGB 8.6*   HCT 25.9*      MCV 90   MCH 29.8   MCHC 33.2     CMP:   Recent Labs  Lab 07/02/17  1145   GLU 89   CALCIUM 8.9   ALBUMIN 2.5*   PROT 5.7*      K 4.3   CO2 23      BUN 5*   CREATININE 0.7   ALKPHOS 133   ALT 14   AST 25   BILITOT 0.3        Physical Exam:   Constitutional: She is oriented to person, place, and time. She appears well-developed and well-nourished. No distress.    HENT:   Head: Normocephalic and atraumatic.      Cardiovascular: Normal rate, regular rhythm and normal heart sounds.     Pulmonary/Chest: Effort normal and breath sounds normal.        Abdominal: Soft. Bowel sounds are normal. She exhibits no distension. There is no tenderness.   Fundus firm below umbilicus  C/S incision clean w/o soi              Musculoskeletal: She exhibits no edema.        Right shoulder: She exhibits no swelling.       Neurological: She is alert and oriented to person, place, and time.    Skin: Skin is warm and dry.    Psychiatric: She has a normal mood and affect.

## 2017-07-03 NOTE — PLAN OF CARE
Problem: Patient Care Overview  Goal: Plan of Care Review  Outcome: Ongoing (interventions implemented as appropriate)  VSS. Pt. Ambulating, voiding, and passing flatus. Pumping for infant in NICU. Pt. tolerating regular diet. Pain controlled throughout shift by PO pain medications. Fundus firm, midline with light lochia. Incision clean and dry, edges approximated, sutures intact without infection. Patient safety maintained.

## 2017-07-03 NOTE — LACTATION NOTE
Lactation rounds. Patient not in room again. Nurse notified and will hopefully have patient contact lactation when able.

## 2017-07-04 VITALS
TEMPERATURE: 98 F | BODY MASS INDEX: 28.72 KG/M2 | DIASTOLIC BLOOD PRESSURE: 65 MMHG | HEIGHT: 67 IN | OXYGEN SATURATION: 99 % | WEIGHT: 183 LBS | SYSTOLIC BLOOD PRESSURE: 112 MMHG | RESPIRATION RATE: 18 BRPM | HEART RATE: 89 BPM

## 2017-07-04 PROCEDURE — 51702 INSERT TEMP BLADDER CATH: CPT

## 2017-07-04 PROCEDURE — 25000003 PHARM REV CODE 250: Performed by: STUDENT IN AN ORGANIZED HEALTH CARE EDUCATION/TRAINING PROGRAM

## 2017-07-04 PROCEDURE — 36000686 HC CESAREAN SECTION LEVEL II

## 2017-07-04 PROCEDURE — 25000003 PHARM REV CODE 250: Performed by: OBSTETRICS & GYNECOLOGY

## 2017-07-04 RX ADMIN — OXYCODONE HYDROCHLORIDE AND ACETAMINOPHEN 1 TABLET: 10; 325 TABLET ORAL at 03:07

## 2017-07-04 RX ADMIN — OXYCODONE HYDROCHLORIDE AND ACETAMINOPHEN 1 TABLET: 10; 325 TABLET ORAL at 12:07

## 2017-07-04 RX ADMIN — IBUPROFEN 600 MG: 600 TABLET, FILM COATED ORAL at 03:07

## 2017-07-04 RX ADMIN — SIMETHICONE CHEW TAB 80 MG 80 MG: 80 TABLET ORAL at 12:07

## 2017-07-04 RX ADMIN — DOCUSATE SODIUM 200 MG: 100 CAPSULE, LIQUID FILLED ORAL at 08:07

## 2017-07-04 RX ADMIN — Medication 150 MG: at 08:07

## 2017-07-04 RX ADMIN — OXYCODONE HYDROCHLORIDE AND ACETAMINOPHEN 1 TABLET: 10; 325 TABLET ORAL at 08:07

## 2017-07-04 RX ADMIN — IBUPROFEN 600 MG: 600 TABLET, FILM COATED ORAL at 08:07

## 2017-07-04 NOTE — SUBJECTIVE & OBJECTIVE
Hospital course: 06/30/2017 - Transferred from outside hospital. Placed on continuous fetal monitoring, deceleration x1 noted, but overall remained reassuring. C/o ctx but cervix remained unchanged. Ctx subsided and she was deescalated to NST BID.  07/01/2017 - RLTCS or non-reassuring fetal surveillance, uncomplicated. Both twins in NICU.  07/03/2017 - Doing well, meeting milestones.   07/04/2017 - Doing wel, meeting milestones. Pt requests discharge as she lives in [place].    Interval History:   She is doing well this morning. She is tolerating a regular diet without nausea or vomiting. She is voiding spontaneously. She is ambulating. She has passed flatus, and has a BM. Vaginal bleeding is mild. She denies fever or chills. Abdominal pain is moderate and controlled with oral medications. She is pumping breastmilk for babies in NICU.     Objective:     Vital Signs (Most Recent):  Temp: 98 °F (36.7 °C) (07/04/17 0733)  Pulse: 89 (07/04/17 0733)  Resp: 18 (07/04/17 0733)  BP: 112/65 (07/04/17 0733)  SpO2: 99 % (07/04/17 0000) Vital Signs (24h Range):  Temp:  [97.7 °F (36.5 °C)-98.7 °F (37.1 °C)] 98 °F (36.7 °C)  Pulse:  [53-89] 89  Resp:  [18] 18  SpO2:  [97 %-99 %] 99 %  BP: (105-131)/(65-83) 112/65     Weight: 83 kg (183 lb)  Body mass index is 28.66 kg/m².    No intake or output data in the 24 hours ending 07/04/17 1002    Significant Labs:  Lab Results   Component Value Date    GROUPTRH B POS 06/30/2017       Recent Labs  Lab 07/02/17  1145   HGB 8.6*   HCT 25.9*       CBC:   Recent Labs  Lab 07/02/17  1145   WBC 15.84*   RBC 2.89*   HGB 8.6*   HCT 25.9*      MCV 90   MCH 29.8   MCHC 33.2     I have personallly reviewed all pertinent lab results from the last 24 hours.    Physical Exam:   Constitutional: She is oriented to person, place, and time. She appears well-developed and well-nourished. No distress.    HENT:   Head: Normocephalic and atraumatic.     Neck: Normal range of motion.    Cardiovascular:  Normal rate, regular rhythm and normal heart sounds.     Pulmonary/Chest: Effort normal and breath sounds normal.        Abdominal: Soft. She exhibits abdominal incision (Clean, Dry, and intact.). She exhibits no distension. There is no rebound and no guarding.   Fundus of uterus firm and palpable below umbilicus.             Musculoskeletal: Normal range of motion and moves all extremeties.       Neurological: She is alert and oriented to person, place, and time.    Skin: Skin is warm and dry. She is not diaphoretic.    Psychiatric: She has a normal mood and affect. Her behavior is normal. Judgment and thought content normal.

## 2017-07-04 NOTE — PROGRESS NOTES
RN reviewed d/c instructions with pt-pt stated understanding. Follow up appt in 6 weeks to be made with OB. Pt received prescriptions. Stable condition. Ok to d/c home today

## 2017-07-04 NOTE — LACTATION NOTE
Lactation rounds. Patient reports she has been pumping and obtained 40 mL with last pumping this morning. Reviewed discharge instructions including pumping instructions and milk collection, storage, and transportation. Has Medlea Pump in Style she plans to use at home. Discussed benefit of using hospital grade rental, and encouraged to keep kit with tubing in the event she rents at a later time.  Encouraged to call lactation warmline as needed for further assistance. Voices understanding.

## 2017-07-04 NOTE — PLAN OF CARE
Problem: Breastfeeding (Adult,Obstetrics,Pediatric)  Goal: Signs and Symptoms of Listed Potential Problems Will be Absent, Minimized or Managed (Breastfeeding)  Signs and symptoms of listed potential problems will be absent, minimized or managed by discharge/transition of care (reference Breastfeeding (Adult,Obstetrics,Pediatric) CPG).   Outcome: Ongoing (interventions implemented as appropriate)  Patient will effectively pump comfortably, with proper use of breast pump, 8 or more times per 24 hour period and will establish milk supply adequate to meet infants' nutritional needs. As infants' medical condition permits, patient will hold infants skin to skin as often as able. Patient will contact lactation as needed for assistance or with any questions or concerns.

## 2017-07-04 NOTE — ASSESSMENT & PLAN NOTE
- Patient doing well. Continue routine management and advances.  - Continue PO pain meds. Pain well controlled.  - Heme: H/h 9/26  - Encourage ambulation  - Contraception - will discuss when pt returns from NICU  - Pumping for babies in NICU  - Rh pos

## 2017-07-04 NOTE — DISCHARGE INSTRUCTIONS
PUMPING FOR AN NICU BABY    Preparation and Hygiene:    1. Shower daily.  2. Wear a clean bra each day and wash daily in warm soapy water.  3. Change wet or moist breast pads frequently.  Moist pads can promote growth of germs.  4. Actively wash your hands, paying close attention to the area around and under your fingernails, thoroughly with soap and water for 15 seconds before pumping or handling your milk.  Re-wash your hands if you touch anything (scratching your nose, answering the phone, etc) while pumping or handling your milk.   5. Before pumping your breasts, assemble the pump collection kit and have ready the sterile container and labels.  Place these items on a clean surface next to the breastpump.  6. Each time after you have finished pumping, take apart all of the parts of the breastpump collection kit and place them in a separate cleaning container (do not place them in the sink).  Be sure to remove the yellow valve from the breastshield and separate the white membrane from the yellow valve.  Rinse all of these parts with cool water.  Then use a new sponge and/or bottle brush and dishwashing detergent to clean the parts.  Rinse off the soapy water with cool water and air dry on a clean towel covered with a clean cloth.  All parts may also be washed after each use in the top rack of a .  7. Once each day, sterilize all of the parts of the breastpump collection kit.  This can be done by boiling the kit parts for 10 minutes or by using a Quick Clean Micro-Steam Bag made by [employer]  8. If condensation appears in the tubing, continue to run the pump with the tubing attached for 1-2 minutes or until the tubing is dry.   9. Notify your babys nurse or doctor if you become ill or need to take any medication, even over-the-counter medicines.        Collection and Storage of Expressed Breastmilk:         1. Pump your breasts at least 8-10 times every 24 hours.  Double pump (both breasts at  the  same time) for at least 15-20 minutes using the most suction that is comfortable.    2. Write the date and time of pumping and the name of any medications you are takingon the babys pre-printed hospital identification label.   3. Place your babys pre-printed hospital identification label on each container of breastmilk.  Additional pre-printed labels can be obtained from your babys nurse.  If your expressed breastmilk is not correctly labeled, the nurse cannot feed the milk to the baby.       4. Place a brightly colored sticker on the top of each container of milk pumped during the first 30 days.  This identifies the milk as special and having higher levels of nutrients and anti-infective properties that are so important for your baby.  Additional stickers can be obtained from the lactation consultants or your babys nurse.  5.   Do not touch the inside of the storage containers or lids.  6.      Pour the amount of expressed milk needed for 1 of your babys feedings into each storage container. Use a new container(s) for each pumping.  Additional storage containers can be obtained from your babys nurse.        7.       Tightly screw the lid onto the container and place immediately into the    refrigerator for daily transportation to the hospital. Do not freeze your milk unless asked to do so by your babys nurse.  However, if you are not able to visit your baby each day, place the expressed breastmilk in the freezer.  8.   Expressed breastmilk should be refrigerated or frozen within 4 hours of pumping.  9.      Do not store expressed breastmilk on the door of your refrigerator or freezer where the temperature is warmer.         Transportation of Expressed Breastmilk:    1. Refrigerated breastmilk or frozen milk should be packed tightly together in a cooler with frozen, blue gel-packs to keep the milk frozen.  DO NOT USE ICE CUBES (WET ICE) TO TRANSPORT FROZEN MILK.   A clean towel can be used to fill any extra  space between containers of frozen milk.  2.    Bring your expressed milk from home each time you visit the baby.          NICU Lactation consultant: Chelsey Meelndez [phone]    Lactation Warmline [phone]    Community Resources    [facility] Breastfeeding Warmline:  [phone]  Local [facility] clinics: provide incentives and breastpumps to eligible mothers  La Leche [employer] (LLLI):  mother-to-mother support group website        www.lll.[employer]  Local La Leche League mother-to-mother support groups:        www.DigitalScirocco        La Leche League [facility]         www.judy@[employer].com  Dr. Angelito Garcia website for latch videos and general information:        www.breastfeedinginc.ca  Infant Risk Center is a call center that provides information about the safety of taking medications while breastfeeding.  Call 1-983.291.4996, M-F, 8am-5pm, [place].  International Lactation Consultant Association provides resources for assistance:        www.ilca.org  [facility] Breastfeeding Coalition provides informationand resources for parents  and the community    http://louisianabreastfeeding.org     Nilsa mom provides resources for assistance:        www.nolamom.org  [employer] for Healthy Babies:  8-882-982-BABY(7353)  [employer] provides a list of breastfeeding services by zip code:        www.reportbrainLovelace Regional Hospital, RoswellLombardi Software.org  [employer]ait:  [phone] a breastfeeding support group for women of color

## 2017-07-04 NOTE — LACTATION NOTE
Lactation rounds. Patient recently returned to room from visiting with babies. Medela Symphony pump set up at bedside. Instructed on proper usage and to adjust suction according to comfort level. Verified appropriate flange fit- 27mm. Educated mother on frequency and duration of pumping in order to promote and maintain full milk supply. Hands on pumping technique reviewed. Encouraged hand expression after. Instructed mother on cleaning of breast pump parts. Reviewed proper milk handling, collection, storage, and transportation. Encouraged skin to skin contact as able when visiting babies. Also reviewed use of her personal Pump in Style breast pump and possible rental of hospital grade for use when home. Voices understanding of instructions. Encouraged to call lactation and/or staff nurse for pumping assistance as needed.

## 2017-07-04 NOTE — DISCHARGE SUMMARY
[facility]  Obstetrics  Discharge Summary      Patient Name: Modesta George  MRN: [id number]  Admission Date: 2017  Hospital Length of Stay: 3 days  Discharge Date and Time:  2017 10:22 AM  Attending Physician: Nora Pineda MD   Discharging Provider: Rosendo Melendrez MD  Primary Care Provider: [facility]    HPI: Modesta George is a 31 y.o. [id number] Di-Di twins at 34w5d presents from outside hospital ([facility]) with category 2 strip for both fetal tracings. Patient has history of  section due to non reassuring fetal heart tones and history of post partum Eclampsia. Denies HA, vision changes, SOB. Does endorse RUQ pain intermittent over past few days.     This IUP is complicated by GBS UTI and abnormal quad screen earlier in pregnancy and declined confirmatory testing. Patient reports migraines and backpain that currently treated with Percocet. Baby B previously polyhydramniotic but on newest US MVP 7.8cm    Procedure(s) (LRB):  DELIVERY- SECTION (N/A)     Hospital Course:   2017 - Transferred from outside hospital. Placed on continuous fetal monitoring, deceleration x1 noted, but overall remained reassuring. C/o ctx but cervix remained unchanged. Ctx subsided and she was deescalated to NST BID.  2017 - RLTCS or non-reassuring fetal surveillance, uncomplicated. Both twins in NICU.  2017 - Doing well, meeting milestones.   2017 - Doing wel, meeting milestones. Pt requests discharge as she lives in [place].    Consults         Status Ordering Provider     Consult to Lactation  Use PRN     Provider:  (Not yet assigned)    Acknowledged GATITO REYNA          Final Active Diagnoses:    Diagnosis Date Noted POA    PRINCIPAL PROBLEM:  S/P repeat low transverse  [Z98.891] 2017 Not Applicable    History of postpartum eclampsia [Z87.59] 2017 Not Applicable    Hx of migraine headaches [Z86.69] 2017 Not  Applicable      Problems Resolved During this Admission:    Diagnosis Date Noted Date Resolved POA    Non-reassuring electronic fetal monitoring tracing [O76] 06/30/2017 07/03/2017 Yes    Dichorionic diamniotic twin pregnancy in third trimester [O30.043] 01/30/2017 07/03/2017 Yes        Labs:   CBC   Recent Labs  Lab 07/02/17  1145   WBC 15.84*   HGB 8.6*   HCT 25.9*            Feeding Method: breast    Immunizations     Date Immunization Status Dose Route/Site Given by    07/01/17 1847 MMR Incomplete 0.5 mL Subcutaneous/Left deltoid     07/01/17 1847 Tdap Incomplete 0.5 mL Intramuscular/Left deltoid              Adrian George [[id number]]     Delivery:    Episiotomy:     Lacerations:     Repair suture:     Repair # of packets:     Blood loss (ml):       Birth information:  YOB: 2016   Time of birth: 5:20 PM   Sex: male   Delivery type:    Gestational Age: 34w6d    Delivery Clinician:      Other providers:       Additional  information:  Forceps:    Vacuum:    Breech:    Observed anomalies      Living?:           APGARS  One minute Five minutes Ten minutes   Skin color:         Heart rate:         Grimace:         Muscle tone:         Breathing:         Totals: 9  9        Placenta: Delivered:       appearance     GLADYS George [[id number]]     Delivery:    Episiotomy:     Lacerations:     Repair suture:     Repair # of packets:     Blood loss (ml):       Birth information:  YOB: 2016   Time of birth: 5:21 PM   Sex: male   Delivery type:    Gestational Age: 34w6d    Delivery Clinician:      Other providers:       Additional  information:  Forceps:    Vacuum:    Breech:    Observed anomalies      Living?:           APGARS  One minute Five minutes Ten minutes   Skin color:         Heart rate:         Grimace:         Muscle tone:         Breathing:         Totals: 9  9        Placenta: Delivered:       appearance    Pending Diagnostic Studies:     None           Discharged Condition: good    Disposition: Home or Self Care    Follow Up:  Follow-up Information     Gladys Reaves CNM. Schedule an appointment as soon as possible for a visit in 6 weeks.    Specialty:  Obstetrics and Gynecology  Why:  Post Partum Appointment  Contact information:  [address] [place] [address]  [place] [address]  [phone]             Gladys Reaves CNM. Schedule an appointment as soon as possible for a visit in 6 weeks.    Specialty:  Obstetrics and Gynecology  Why:  For blood pressure check  Contact information:  [address] [place] [address]  [place] [address]  [phone]                 Patient Instructions:     Diet general     Call MD for:  temperature >100.4     Call MD for:  persistent nausea and vomiting or diarrhea     Call MD for:  severe uncontrolled pain     Call MD for:  redness, tenderness, or signs of infection (pain, swelling, redness, odor or green/yellow discharge around incision site)     Call MD for:  difficulty breathing or increased cough     Call MD for:  severe persistent headache     Call MD for:  persistent dizziness, light-headedness, or visual disturbances     Call MD for:  increased confusion or weakness     Activity as tolerated     No dressing needed       Medications:  Current Discharge Medication List      START taking these medications    Details   docusate sodium (COLACE) 100 MG capsule Take 2 capsules (200 mg total) by mouth 2 (two) times daily.  Refills: 0      ibuprofen (ADVIL,MOTRIN) 600 MG tablet Take 1 tablet (600 mg total) by mouth every 6 (six) hours as needed for Pain.  Qty: 30 tablet, Refills: 1      oxycodone-acetaminophen (PERCOCET) 5-325 mg per tablet Take 1 tablet by mouth every 4 (four) hours as needed.  Qty: 25 tablet, Refills: 0         CONTINUE these medications which have NOT CHANGED    Details   aspirin 81 MG Chew Take 81 mg by mouth once daily.      FAMOTIDINE (PEPCID ORAL)  Take by mouth.      FERROUS FUMARATE (IRON ORAL) Take by mouth.      MAGNESIUM OXIDE (MAG-OXIDE ORAL) Take by mouth.      PNV95/FERROUS FUMARATE/FA (PRENATAL ORAL) Take by mouth.         STOP taking these medications       ACETAMINOPHEN (TYLENOL ORAL) Comments:   Reason for Stopping:         oxycodone-acetaminophen (PERCOCET)  mg per tablet Comments:   Reason for Stopping:               Rosendo Melendrez MD  Obstetrics  [facility]

## 2017-07-04 NOTE — PROGRESS NOTES
[facility]  Obstetrics  Postpartum Progress Note    Patient Name: Modesta George  MRN: [id number]  Admission Date: 2017  Hospital Length of Stay: 3 days  Attending Physician: Nora Pineda MD  Primary Care Provider: [facility]    Subjective:     Principal Problem:S/P repeat low transverse     Hospital course: 2017 - Transferred from outside hospital. Placed on continuous fetal monitoring, deceleration x1 noted, but overall remained reassuring. C/o ctx but cervix remained unchanged. Ctx subsided and she was deescalated to NST BID.  2017 - RLTCS or non-reassuring fetal surveillance, uncomplicated. Both twins in NICU.  2017 - Doing well, meeting milestones.   2017 - Doing wel, meeting milestones. Pt requests discharge as she lives in [place].    Interval History:   She is doing well this morning. She is tolerating a regular diet without nausea or vomiting. She is voiding spontaneously. She is ambulating. She has passed flatus, and has a BM. Vaginal bleeding is mild. She denies fever or chills. Abdominal pain is moderate and controlled with oral medications. She is pumping breastmilk for babies in NICU.     Objective:     Vital Signs (Most Recent):  Temp: 98 °F (36.7 °C) (17)  Pulse: 89 (17 07)  Resp: 18 (17 07)  BP: 112/65 (17 07)  SpO2: 99 % (17 0000) Vital Signs (24h Range):  Temp:  [97.7 °F (36.5 °C)-98.7 °F (37.1 °C)] 98 °F (36.7 °C)  Pulse:  [53-89] 89  Resp:  [18] 18  SpO2:  [97 %-99 %] 99 %  BP: (105-131)/(65-83) 112/65     Weight: 83 kg (183 lb)  Body mass index is 28.66 kg/m².    No intake or output data in the 24 hours ending 17 1002    Significant Labs:  Lab Results   Component Value Date    GROUPTRH B POS 2017       Recent Labs  Lab 07/02/17  1145   HGB 8.6*   HCT 25.9*       CBC:   Recent Labs  Lab 17  1145   WBC 15.84*   RBC 2.89*   HGB 8.6*   HCT 25.9*      MCV 90    [facility] [place] 29.8   [facility] 33.2     I have personallly reviewed all pertinent lab results from the last 24 hours.    Physical Exam:   Constitutional: She is oriented to person, place, and time. She appears well-developed and well-nourished. No distress.    HENT:   Head: Normocephalic and atraumatic.     Neck: Normal range of motion.    Cardiovascular: Normal rate, regular rhythm and normal heart sounds.     Pulmonary/Chest: Effort normal and breath sounds normal.        Abdominal: Soft. She exhibits abdominal incision (Clean, Dry, and intact.). She exhibits no distension. There is no rebound and no guarding.   Fundus of uterus firm and palpable below umbilicus.             Musculoskeletal: Normal range of motion and moves all extremeties.       Neurological: She is alert and oriented to person, place, and time.    Skin: Skin is warm and dry. She is not diaphoretic.    Psychiatric: She has a normal mood and affect. Her behavior is normal. Judgment and thought content normal.       Assessment/Plan:     31 y.o. female  for:    History of postpartum eclampsia    - Pre E labs wnl on admission  - BP: (105-131)/(65-83) 112/65  - ROS for Pre-E sx negative        Abnormal quad screen    - Stable. Declined confirmatory testing. No intervention.         Hx of migraine headaches    - No HA today          * S/P repeat low transverse     - Patient doing well. Continue routine management and advances.  - Continue PO pain meds. Pain well controlled.  - Heme: H/h   - Encourage ambulation  - Contraception - will discuss when pt returns from NICU  - Pumping for babies in NICU  - Rh pos            Disposition: Pt has met all milestones, will discharge today.     Rosendo Melendrez MD  Obstetrics  [facility]

## 2017-07-06 ENCOUNTER — HOSPITAL ENCOUNTER (EMERGENCY)
Facility: OTHER | Age: 32
Discharge: HOME | End: 2017-07-06
Attending: OBSTETRICS & GYNECOLOGY | Admitting: OBSTETRICS & GYNECOLOGY
Payer: OTHER GOVERNMENT

## 2017-07-06 VITALS
SYSTOLIC BLOOD PRESSURE: 141 MMHG | OXYGEN SATURATION: 100 % | TEMPERATURE: 98 F | DIASTOLIC BLOOD PRESSURE: 69 MMHG | HEART RATE: 77 BPM | RESPIRATION RATE: 18 BRPM

## 2017-07-06 DIAGNOSIS — O16.9 HYPERTENSION IN DELIVERED PREGNANCY: Primary | ICD-10-CM

## 2017-07-06 LAB
ALBUMIN SERPL BCP-MCNC: 2.8 G/DL
ALP SERPL-CCNC: 121 U/L
ALT SERPL W/O P-5'-P-CCNC: 31 U/L
ANION GAP SERPL CALC-SCNC: 9 MMOL/L
AST SERPL-CCNC: 27 U/L
BASOPHILS # BLD AUTO: ABNORMAL K/UL
BASOPHILS NFR BLD: 0 %
BILIRUB SERPL-MCNC: 0.4 MG/DL
BUN SERPL-MCNC: 10 MG/DL
CALCIUM SERPL-MCNC: 8.7 MG/DL
CHLORIDE SERPL-SCNC: 108 MMOL/L
CO2 SERPL-SCNC: 24 MMOL/L
CREAT SERPL-MCNC: 0.8 MG/DL
CREAT UR-MCNC: 186.9 MG/DL
DIFFERENTIAL METHOD: ABNORMAL
EOSINOPHIL # BLD AUTO: ABNORMAL K/UL
EOSINOPHIL NFR BLD: 2 %
ERYTHROCYTE [DISTWIDTH] IN BLOOD BY AUTOMATED COUNT: 14.7 %
EST. GFR  (AFRICAN AMERICAN): >60 ML/MIN/1.73 M^2
EST. GFR  (NON AFRICAN AMERICAN): >60 ML/MIN/1.73 M^2
GLUCOSE SERPL-MCNC: 75 MG/DL
HCT VFR BLD AUTO: 30.9 %
HGB BLD-MCNC: 10 G/DL
LYMPHOCYTES # BLD AUTO: ABNORMAL K/UL
LYMPHOCYTES NFR BLD: 23 %
MCH RBC QN AUTO: 30.1 PG
MCHC RBC AUTO-ENTMCNC: 32.4 %
MCV RBC AUTO: 93 FL
METAMYELOCYTES NFR BLD MANUAL: 2 %
MONOCYTES # BLD AUTO: ABNORMAL K/UL
MONOCYTES NFR BLD: 8 %
MYELOCYTES NFR BLD MANUAL: 2 %
NEUTROPHILS NFR BLD: 60 %
NEUTS BAND NFR BLD MANUAL: 3 %
PLATELET # BLD AUTO: 284 K/UL
PLATELET BLD QL SMEAR: ABNORMAL
PMV BLD AUTO: 9.2 FL
POTASSIUM SERPL-SCNC: 3.8 MMOL/L
PROT SERPL-MCNC: 6.5 G/DL
PROT UR-MCNC: 26 MG/DL
PROT/CREAT RATIO, UR: 0.14
RBC # BLD AUTO: 3.32 M/UL
SODIUM SERPL-SCNC: 141 MMOL/L
WBC # BLD AUTO: 9.95 K/UL

## 2017-07-06 PROCEDURE — 84156 ASSAY OF PROTEIN URINE: CPT

## 2017-07-06 PROCEDURE — 25000003 PHARM REV CODE 250: Performed by: STUDENT IN AN ORGANIZED HEALTH CARE EDUCATION/TRAINING PROGRAM

## 2017-07-06 PROCEDURE — 99284 EMERGENCY DEPT VISIT MOD MDM: CPT | Mod: ,,, | Performed by: OBSTETRICS & GYNECOLOGY

## 2017-07-06 PROCEDURE — 85027 COMPLETE CBC AUTOMATED: CPT

## 2017-07-06 PROCEDURE — 80053 COMPREHEN METABOLIC PANEL: CPT

## 2017-07-06 PROCEDURE — 85007 BL SMEAR W/DIFF WBC COUNT: CPT

## 2017-07-06 PROCEDURE — 99283 EMERGENCY DEPT VISIT LOW MDM: CPT

## 2017-07-06 RX ORDER — BUTALBITAL, ACETAMINOPHEN AND CAFFEINE 50; 325; 40 MG/1; MG/1; MG/1
1 TABLET ORAL EVERY 4 HOURS PRN
Status: DISCONTINUED | OUTPATIENT
Start: 2017-07-06 | End: 2017-07-06 | Stop reason: HOSPADM

## 2017-07-06 RX ADMIN — BUTALBITAL, ACETAMINOPHEN AND CAFFEINE 1 TABLET: 50; 325; 40 TABLET ORAL at 01:07

## 2017-07-06 NOTE — ED PROVIDER NOTES
Encounter Date: 2017       History     Chief Complaint   Patient presents with    Headache     blood pressure check.  Pt PP c/s      Modesta George is a 31 y.o.  w a history of postpartum eclampsia in previous pregnancy s/p LTCS on 17 at 35 and 7 of di di twins.  She went to an outside ER last night with complaints of headache, chest pain, mid upper back pain, shortness of breath, and bilateral lower extremity edema, and was found to have systolic bps in the 170s-180s & proteinuria. Per patient, she received Mag and was started on 200mg labetalol BID. She continues to have a frontal headache today, 4/10, with associated photophobia, as well as mid upper back pain and edema.             Review of patient's allergies indicates:   Allergen Reactions    Sulfa (sulfonamide antibiotics) Swelling     Past Medical History:   Diagnosis Date    Arthritis of back     Bulging lumbar disc     Migraines      Past Surgical History:   Procedure Laterality Date     SECTION       No family history on file.  Social History   Substance Use Topics    Smoking status: Never Smoker    Smokeless tobacco: Never Used    Alcohol use No     Review of Systems   Constitutional: Negative for chills and fever.   Eyes: Positive for photophobia. Negative for visual disturbance.   Respiratory: Negative for shortness of breath.    Cardiovascular: Positive for leg swelling. Negative for chest pain.   Gastrointestinal: Negative for abdominal pain.   Musculoskeletal: Positive for back pain.       Physical Exam     Initial Vitals   BP Pulse Resp Temp SpO2   17 1151 17 1151 17 1152 17 1152 17 1152   132/75 79 18 98.1 °F (36.7 °C) 100 %      MAP       17 1151       94         Physical Exam    Constitutional: She appears well-developed and well-nourished.   HENT:   Head: Normocephalic and atraumatic.   Neck: Normal range of motion.   Cardiovascular: Normal rate, regular rhythm and normal  heart sounds.   No murmur heard.  Pulmonary/Chest: No respiratory distress.   Abdominal: Soft. There is no tenderness.   Musculoskeletal: She exhibits edema (2+ pitting).   Neurological: She is alert and oriented to person, place, and time.   Psychiatric: She has a normal mood and affect. Her behavior is normal. Judgment and thought content normal.         ED Course   Procedures       Recent Labs  Lab 07/02/17  0437 07/02/17  1145 07/06/17  1258   WBC 16.58* 15.84* 9.95   HGB 8.6* 8.6* 10.0*   HCT 25.9* 25.9* 30.9*   MCV 90 90 93    209 284        Recent Labs  Lab 06/30/17  1210 06/30/17  1215 07/02/17  1117 07/02/17  1145 07/06/17  1258     --   --  139 141   K 3.8  --   --  4.3 3.8     --   --  108 108   CO2 21*  --   --  23 24   BUN 4*  --   --  5* 10   CREATININE 0.6  --   --  0.7 0.8   GLU 59*  --   --  89 75   PROT 6.2  --   --  5.7* 6.5   BILITOT 0.5  --   --  0.3 0.4   ALKPHOS 154*  --   --  133 121   ALT 12  --   --  14 31   AST 17  --   --  25 27   UTPCR  --  0.15 0.14  --  0.14        ED Course  Pt given Fioricet with moderate headache relief  Bps normal - mild range  Pre-E labs within normal limits                               Clinical Impression:   The encounter diagnosis was Hypertension in delivered pregnancy.    Disposition:   Disposition: Discharged  Condition: Stable  Pt's BPs controlled on BID labetalol  Pt advised to continue to take medication as prescribed, and call her doctor or return to the ER should she experience any worsening headaches, vision changes, shortness of breath, or worsening edema      Isaias Doe, PGY1  OBGYN                    Isaias Doe MD  Resident  07/06/17 [id number]

## 2018-06-11 PROBLEM — O28.0 ABNORMAL QUAD SCREEN: Status: RESOLVED | Noted: 2017-04-04 | Resolved: 2018-06-11
